# Patient Record
Sex: FEMALE | Race: BLACK OR AFRICAN AMERICAN | NOT HISPANIC OR LATINO | Employment: FULL TIME | URBAN - METROPOLITAN AREA
[De-identification: names, ages, dates, MRNs, and addresses within clinical notes are randomized per-mention and may not be internally consistent; named-entity substitution may affect disease eponyms.]

---

## 2020-11-13 ENCOUNTER — HOSPITAL ENCOUNTER (EMERGENCY)
Facility: HOSPITAL | Age: 25
Discharge: HOME | End: 2020-11-13
Attending: EMERGENCY MEDICINE
Payer: MEDICAID

## 2020-11-13 VITALS
HEIGHT: 59 IN | HEART RATE: 69 BPM | TEMPERATURE: 98 F | WEIGHT: 151 LBS | BODY MASS INDEX: 30.44 KG/M2 | SYSTOLIC BLOOD PRESSURE: 126 MMHG | OXYGEN SATURATION: 100 % | DIASTOLIC BLOOD PRESSURE: 75 MMHG | RESPIRATION RATE: 18 BRPM
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
## 2020-11-13 DIAGNOSIS — M25.532 WRIST PAIN, ACUTE, LEFT: Primary | ICD-10-CM

## 2020-11-13 LAB
B-HCG UR QL: NEGATIVE
CTP QC/QA: YES

## 2020-11-13 PROCEDURE — 99283 EMERGENCY DEPT VISIT LOW MDM: CPT | Mod: 25

## 2020-11-13 PROCEDURE — 81025 URINE PREGNANCY TEST: CPT | Performed by: EMERGENCY MEDICINE

## 2020-11-13 RX ORDER — NAPROXEN 500 MG/1
500 TABLET ORAL 2 TIMES DAILY WITH MEALS
Qty: 14 TABLET | Refills: 0 | Status: SHIPPED | OUTPATIENT
Start: 2020-11-13 | End: 2021-06-22 | Stop reason: SDUPTHER

## 2020-11-14 NOTE — ED PROVIDER NOTES
Encounter Date: 11/13/2020       History     Chief Complaint   Patient presents with    Wrist Pain     Patient presents to ED secondary to left wrist pain s/p fall. Took BC powder at 1530 today with moderate relief. +swelling, +palpable pulse. Possible deformity.      Vidya Grewal, a 25 y.o. female  has no past medical history on file.     She presents to the ED evaluation of left wrist pain after a FOOSH early this morning while running from a dog.  Patient states that she had pain initially, went to sleep and took BC Goody Powder.  States that after her she had increased pain.  Denies any previous history of fracture or surgery to site.  Pain is worse with movement or touch.  Patient is right-hand dominant.        The history is provided by the patient.     Review of patient's allergies indicates:  No Known Allergies  History reviewed. No pertinent past medical history.  History reviewed. No pertinent surgical history.  History reviewed. No pertinent family history.  Social History     Tobacco Use    Smoking status: Former Smoker    Smokeless tobacco: Never Used   Substance Use Topics    Alcohol use: No    Drug use: No     Review of Systems   Constitutional: Negative for fever.   Gastrointestinal: Negative for nausea and vomiting.   Musculoskeletal: Positive for arthralgias.   Skin: Negative for color change and wound.   Allergic/Immunologic: Negative for immunocompromised state.   Neurological: Negative for weakness and numbness.   Psychiatric/Behavioral: Negative for agitation.   All other systems reviewed and are negative.      Physical Exam     Initial Vitals [11/13/20 1702]   BP Pulse Resp Temp SpO2   126/75 69 18 98.2 °F (36.8 °C) 100 %      MAP       --         Physical Exam    Nursing note and vitals reviewed.  Constitutional: She appears well-developed and well-nourished. She is not diaphoretic. No distress.   HENT:   Head: Normocephalic and atraumatic.   Right Ear: External ear normal.   Left Ear:  External ear normal.   Nose: Nose normal.   Eyes: Conjunctivae and EOM are normal.   Neck: Normal range of motion.   Cardiovascular: Normal rate and regular rhythm.   Pulmonary/Chest: Breath sounds normal. No respiratory distress. She has no wheezes. She has no rhonchi. She has no rales.   Musculoskeletal: Normal range of motion.      Left elbow: Normal.      Left wrist: She exhibits tenderness and bony tenderness. She exhibits normal range of motion, no swelling, no effusion, no crepitus, no deformity and no laceration.      Comments: Left wrist:    Neurological: She is alert and oriented to person, place, and time.   Skin: Skin is warm and dry. Capillary refill takes less than 2 seconds. No rash noted. No erythema.   Psychiatric: She has a normal mood and affect. Thought content normal.         ED Course   Procedures  Labs Reviewed   POCT URINE PREGNANCY          Imaging Results          X-Ray Wrist Complete Left (Final result)  Result time 11/13/20 18:19:18    Final result by Khris Brown MD (11/13/20 18:19:18)                 Impression:      1. No convincing acute displaced fracture or dislocation of the wrist.      Electronically signed by: Khris Brown MD  Date:    11/13/2020  Time:    18:19             Narrative:    EXAMINATION:  XR WRIST COMPLETE 3 VIEWS LEFT    CLINICAL HISTORY:  Unspecified fall, initial encounter    TECHNIQUE:  PA, lateral, and oblique views of the left wrist were performed.    COMPARISON:  None    FINDINGS:  Three views left wrist.    No acute displaced fracture or dislocation of the wrist.  No radiopaque foreign body.  There is suspected irregular fusion of the physis versus remote injury of the distal radius along the thenar aspect.  No radiopaque foreign body.                                 Medical Decision Making:   Initial Assessment:   Left wrist pain   Differential Diagnosis:   Fracture, dislocation, sprain/strain   Clinical Tests:   Radiological Study: Ordered and  Reviewed  ED Management:  Pt presents to the ED for evaluation of left wrist pain after FOOSH.  ROM limited d/t pain.  No deformity noted.  NVI.  NTTP scaphoid.  No concerning abnormalities on x-ray.  Patient was given information on symptomatic control and reasons for return. Instructed on need for repeat x-ray if no improvement.  She verbalized understanding and agreement with plan.                               Clinical Impression:       ICD-10-CM ICD-9-CM   1. Wrist pain, acute, left  M25.532 719.43   2. Fall  W19.XXXA E888.9                          ED Disposition Condition    Discharge Stable        ED Prescriptions     Medication Sig Dispense Start Date End Date Auth. Provider    naproxen (NAPROSYN) 500 MG tablet Take 1 tablet (500 mg total) by mouth 2 (two) times daily with meals. 14 tablet 11/13/2020  Yaima Dixon PA-C        Follow-up Information     Follow up With Specialties Details Why Contact Info    [facility], Psychology, Family Medicine, Obstetrics   [address] [facility]  [address]  [facility] [address]  [phone]                                         Yaima Dixon PA-C  11/13/20 1924

## 2021-06-22 ENCOUNTER — HOSPITAL ENCOUNTER (EMERGENCY)
Facility: HOSPITAL | Age: 26
Discharge: HOME | End: 2021-06-22
Attending: EMERGENCY MEDICINE
Payer: MEDICAID

## 2021-06-22 VITALS
HEART RATE: 76 BPM | DIASTOLIC BLOOD PRESSURE: 67 MMHG | HEIGHT: 58 IN | SYSTOLIC BLOOD PRESSURE: 114 MMHG | OXYGEN SATURATION: 98 % | WEIGHT: 135 LBS | BODY MASS INDEX: 28.34 KG/M2 | TEMPERATURE: 99 F | RESPIRATION RATE: 18 BRPM

## 2021-06-22 DIAGNOSIS — R07.89 CHEST DISCOMFORT: ICD-10-CM

## 2021-06-22 DIAGNOSIS — R07.9 CHEST PAIN: Primary | ICD-10-CM

## 2021-06-22 LAB
B-HCG UR QL: NEGATIVE
CTP QC/QA: YES

## 2021-06-22 PROCEDURE — 81025 URINE PREGNANCY TEST: CPT | Performed by: PHYSICIAN ASSISTANT

## 2021-06-22 PROCEDURE — 25000003 PHARM REV CODE 250: Performed by: PHYSICIAN ASSISTANT

## 2021-06-22 PROCEDURE — 93005 ELECTROCARDIOGRAM TRACING: CPT

## 2021-06-22 PROCEDURE — 93010 EKG 12-LEAD: ICD-10-PCS | Mod: ,,, | Performed by: INTERNAL MEDICINE

## 2021-06-22 PROCEDURE — 93010 ELECTROCARDIOGRAM REPORT: CPT | Mod: ,,, | Performed by: INTERNAL MEDICINE

## 2021-06-22 PROCEDURE — 99284 EMERGENCY DEPT VISIT MOD MDM: CPT | Mod: 25

## 2021-06-22 RX ORDER — NAPROXEN 500 MG/1
500 TABLET ORAL
Status: COMPLETED | OUTPATIENT
Start: 2021-06-22 | End: 2021-06-22

## 2021-06-22 RX ORDER — NAPROXEN 500 MG/1
500 TABLET ORAL 2 TIMES DAILY WITH MEALS
Qty: 14 TABLET | Refills: 0 | Status: SHIPPED | OUTPATIENT
Start: 2021-06-22 | End: 2022-11-28

## 2021-06-22 RX ADMIN — NAPROXEN 500 MG: 500 TABLET ORAL at 04:06

## 2022-11-28 ENCOUNTER — INITIAL PRENATAL (OUTPATIENT)
Dept: OBSTETRICS AND GYNECOLOGY | Facility: CLINIC | Age: 27
End: 2022-11-28
Payer: MEDICAID

## 2022-11-28 ENCOUNTER — APPOINTMENT (OUTPATIENT)
Dept: LAB | Facility: HOSPITAL | Age: 27
End: 2022-11-28
Attending: NURSE PRACTITIONER
Payer: MEDICAID

## 2022-11-28 VITALS
WEIGHT: 142.44 LBS | BODY MASS INDEX: 29.77 KG/M2 | SYSTOLIC BLOOD PRESSURE: 130 MMHG | DIASTOLIC BLOOD PRESSURE: 85 MMHG

## 2022-11-28 DIAGNOSIS — Z34.91 PRENATAL CARE IN FIRST TRIMESTER: Primary | ICD-10-CM

## 2022-11-28 LAB
B-HCG UR QL: POSITIVE
CTP QC/QA: YES

## 2022-11-28 PROCEDURE — 99203 PR OFFICE/OUTPT VISIT, NEW, LEVL III, 30-44 MIN: ICD-10-PCS | Mod: S$PBB,TH,, | Performed by: NURSE PRACTITIONER

## 2022-11-28 PROCEDURE — 87591 N.GONORRHOEAE DNA AMP PROB: CPT | Performed by: NURSE PRACTITIONER

## 2022-11-28 PROCEDURE — 87086 URINE CULTURE/COLONY COUNT: CPT | Performed by: NURSE PRACTITIONER

## 2022-11-28 PROCEDURE — 99213 OFFICE O/P EST LOW 20 MIN: CPT | Mod: PBBFAC,PO | Performed by: NURSE PRACTITIONER

## 2022-11-28 PROCEDURE — 88175 CYTOPATH C/V AUTO FLUID REDO: CPT | Performed by: NURSE PRACTITIONER

## 2022-11-28 PROCEDURE — 87491 CHLMYD TRACH DNA AMP PROBE: CPT | Performed by: NURSE PRACTITIONER

## 2022-11-28 PROCEDURE — 81025 URINE PREGNANCY TEST: CPT | Mod: PBBFAC,PO | Performed by: NURSE PRACTITIONER

## 2022-11-28 PROCEDURE — 99999 PR PBB SHADOW E&M-EST. PATIENT-LVL III: CPT | Mod: PBBFAC,,, | Performed by: NURSE PRACTITIONER

## 2022-11-28 PROCEDURE — 99999 PR PBB SHADOW E&M-EST. PATIENT-LVL III: ICD-10-PCS | Mod: PBBFAC,,, | Performed by: NURSE PRACTITIONER

## 2022-11-28 PROCEDURE — 99203 OFFICE O/P NEW LOW 30 MIN: CPT | Mod: S$PBB,TH,, | Performed by: NURSE PRACTITIONER

## 2022-11-28 NOTE — PATIENT INSTRUCTIONS
The Mtriyiqsx58 (MT21 for short) is a noninvasive prenatal blood test that is done after 11 weeks. It can detect chromosomal abnormalities (trisomy 21-Down syndrome, trisomy 18-Starks syndrome, and trisomy 13-Patau syndrome). It can also detect the sex of your baby if you choose to know this. All insurances do not cover this test. We recommend all women who are interested to call this phone number for more information- [phone]    If for some reason your insurance does not cover the MT21 and/or the out of pocket expense is too much, there other options for testing that are usually covered. The names for the alternative screenings are the sequential screen (done between 12-14 weeks) and the quad screen (done between 16-18 weeks). You can call your insurance to verify coverage for these.

## 2022-11-28 NOTE — PROGRESS NOTES
CC: Initial OB    Vidya Grewal is a 27 y.o. female  presents for initial OB.Pregnancy confirmed at [facility] ED via u/s was told she was 6 weeks. She does not have medical records     She  reports intermittent nausea and vomiting. Denies abdominal pain or vaginal bleeding.    PAP: =     Recently quit smoking black and mild 2022  Wears seatbelts    Denies any domestic violence    History reviewed. No pertinent past medical history.  Past Surgical History:   Procedure Laterality Date    MULTIPLE TOOTH EXTRACTIONS      WISDOM TOOTH EXTRACTION       Social History     Socioeconomic History    Marital status: Single   Tobacco Use    Smoking status: Former     Types: Cigarettes    Smokeless tobacco: Never   Substance and Sexual Activity    Alcohol use: No    Drug use: Not Currently     Types: Marijuana    Sexual activity: Yes     Partners: Male     Family History   Problem Relation Age of Onset    Diabetes Maternal Grandmother     Diabetes Mother     Breast cancer Neg Hx     Colon cancer Neg Hx     Ovarian cancer Neg Hx      OB History    Para Term  AB Living   1             SAB IAB Ectopic Multiple Live Births                  # Outcome Date GA Lbr Jesus/2nd Weight Sex Delivery Anes PTL Lv   1 Current                /85   Wt 64.6 kg (142 lb 6.7 oz)   LMP 2022 (Approximate)   BMI 29.77 kg/m²     ROS:  GENERAL: Denies weight gain or weight loss. Feeling well overall.   SKIN: Denies rash or lesions.   HEAD: Denies head injury or headache.   NODES: Denies enlarged lymph nodes.   CHEST: Denies chest pain or shortness of breath.   CARDIOVASCULAR: Denies palpitations or left sided chest pain.   ABDOMEN: + nausea and vomiting No abdominal pain, constipation, diarrhea or rectal bleeding.   URINARY: No frequency, dysuria, hematuria, or burning on urination.  REPRODUCTIVE: See HPI.   BREASTS: Denies pain, lumps, or nipple discharge.   HEMATOLOGIC: No easy bruisability or  excessive bleeding.   MUSCULOSKELETAL: Denies joint pain or swelling.   NEUROLOGIC: Denies syncope or weakness.   PSYCHIATRIC: Denies depression, anxiety or mood swings.    PE:   APPEARANCE: Well nourished, well developed, in no acute distress.  AFFECT: WNL, alert and oriented x 3.  SKIN: No acne or hirsutism.  NECK: Neck symmetric  NODES: No inguinal, cervical, axillary or femoral lymph node enlargement.  CHEST: Good respiratory effort.   ABDOMEN: Soft.  No tenderness or masses. Nondistended.  PELVIC: Normal external female genitalia without lesions. Normal hair distribution. Adequate perineal body, normal urethral meatus. Vagina moist and well rugated without lesions or discharge. Cervix pink, without lesions, discharge or tenderness. No significant cystocele or rectocele.  Bimanual exam shows uterus to be normal size, regular, mobile and nontender. Adnexa without masses or tenderness.  EXTREMITIES: No edema.      ASSESSMENT and PLAN:    ICD-10-CM ICD-9-CM    1. Prenatal care in first trimester  Z34.91 V22.1 Hepatitis C Antibody      HIV 1/2 Ag/Ab (4th Gen)      RPR      Hepatitis B surface antigen      Type & Screen      Rubella antibody, IgG      Urine culture      CBC auto differential      Basic metabolic panel      US OB/GYN Procedure ([facility])      C. trachomatis/N. gonorrhoeae by AMP DNA      POCT urine pregnancy      Hemoglobin Electrophoresis,Hgb A2 [place].      Cystic Fibrosis Mutation Panel      Liquid-Based Pap Smear, Screening          Discussed:  -Nausea/vomiting: small frequent meals throughout the day versus three large meals, stay hydrated: drink plenty of water-try to avoid immediately eating or drinking after vomiting, okay to take OTC unisom and/or vitamin B6    -Contingency screen discussed-information provided  -Foods to avoid in pregnancy (i.e. sushi, raw food, unpasteurized cheeses), limit fish that are high in mercury, warm deli meat.   -Prenatal vitamin options (i.e. stool softener,  DHA)  -Pregnancy A-Z booklet given  -Potential of male provider at delivery due to OB call rotation  -COVID 19 vaccination: no, COVID 19 and increase risk of pregnancy complications if infection occurs, encouraged her to weigh exposure risk and to consider any personal underlying health conditions. COVID 19 vaccine is not contraindicated in pregnancy or with breast feeding, ACOG supports the vaccine and that the benefit of the vaccine most likely outweighs any risk- she states she will think about it and call with her decision.    -Flu vaccination: declines  -Pain, bleeding, and ED precautions given.    All questions answered, patient verbalizes understanding of the above.    FOLLOW-UP in 4 weeks

## 2022-11-29 ENCOUNTER — PATIENT MESSAGE (OUTPATIENT)
Dept: OBSTETRICS AND GYNECOLOGY | Facility: CLINIC | Age: 27
End: 2022-11-29
Payer: MEDICAID

## 2022-11-30 LAB
BACTERIA UR CULT: NORMAL
C TRACH DNA SPEC QL NAA+PROBE: NOT DETECTED
N GONORRHOEA DNA SPEC QL NAA+PROBE: NOT DETECTED

## 2022-12-12 ENCOUNTER — PROCEDURE VISIT (OUTPATIENT)
Dept: OBSTETRICS AND GYNECOLOGY | Facility: CLINIC | Age: 27
End: 2022-12-12
Payer: MEDICAID

## 2022-12-12 DIAGNOSIS — Z34.91 PRENATAL CARE IN FIRST TRIMESTER: ICD-10-CM

## 2022-12-12 PROCEDURE — 76801 OB US < 14 WKS SINGLE FETUS: CPT | Mod: PBBFAC,PO | Performed by: OBSTETRICS & GYNECOLOGY

## 2022-12-12 PROCEDURE — 76801 PR US, OB <14WKS, TRANSABD, SINGLE GESTATION: ICD-10-PCS | Mod: 26,S$PBB,, | Performed by: OBSTETRICS & GYNECOLOGY

## 2022-12-12 PROCEDURE — 76801 OB US < 14 WKS SINGLE FETUS: CPT | Mod: 26,S$PBB,, | Performed by: OBSTETRICS & GYNECOLOGY

## 2022-12-23 ENCOUNTER — PATIENT MESSAGE (OUTPATIENT)
Dept: OBSTETRICS AND GYNECOLOGY | Facility: CLINIC | Age: 27
End: 2022-12-23
Payer: MEDICAID

## 2023-01-23 ENCOUNTER — ROUTINE PRENATAL (OUTPATIENT)
Dept: OBSTETRICS AND GYNECOLOGY | Facility: CLINIC | Age: 28
End: 2023-01-23
Payer: MEDICAID

## 2023-01-23 ENCOUNTER — PATIENT MESSAGE (OUTPATIENT)
Dept: ADMINISTRATIVE | Facility: OTHER | Age: 28
End: 2023-01-23
Payer: MEDICAID

## 2023-01-23 VITALS
BODY MASS INDEX: 33.36 KG/M2 | SYSTOLIC BLOOD PRESSURE: 129 MMHG | WEIGHT: 159.63 LBS | DIASTOLIC BLOOD PRESSURE: 87 MMHG

## 2023-01-23 DIAGNOSIS — Z3A.15 15 WEEKS GESTATION OF PREGNANCY: Primary | ICD-10-CM

## 2023-01-23 PROCEDURE — 99212 OFFICE O/P EST SF 10 MIN: CPT | Mod: TH,S$PBB,, | Performed by: STUDENT IN AN ORGANIZED HEALTH CARE EDUCATION/TRAINING PROGRAM

## 2023-01-23 PROCEDURE — 99212 OFFICE O/P EST SF 10 MIN: CPT | Mod: PBBFAC,PO | Performed by: STUDENT IN AN ORGANIZED HEALTH CARE EDUCATION/TRAINING PROGRAM

## 2023-01-23 PROCEDURE — 99999 PR PBB SHADOW E&M-EST. PATIENT-LVL II: CPT | Mod: PBBFAC,,, | Performed by: STUDENT IN AN ORGANIZED HEALTH CARE EDUCATION/TRAINING PROGRAM

## 2023-01-23 PROCEDURE — 99999 PR PBB SHADOW E&M-EST. PATIENT-LVL II: ICD-10-PCS | Mod: PBBFAC,,, | Performed by: STUDENT IN AN ORGANIZED HEALTH CARE EDUCATION/TRAINING PROGRAM

## 2023-01-23 PROCEDURE — 99212 PR OFFICE/OUTPT VISIT, EST, LEVL II, 10-19 MIN: ICD-10-PCS | Mod: TH,S$PBB,, | Performed by: STUDENT IN AN ORGANIZED HEALTH CARE EDUCATION/TRAINING PROGRAM

## 2023-01-23 NOTE — PROGRESS NOTES
Reason for Visit   Routine Prenatal Visit    HPI   27 y.o., at 15w4d by Estimated Date of Delivery: 7/13/23    Patient feels well today, no complaints    Contractions: No   Bleeding: No   Loss of fluid: No   Fetal movement: Flutters   Nausea: Occasional    Vomiting: No   Headache: no     Pregnancy dating, labs, ultrasound reports, prenatal testing, and problem list; prior records and results; and available outside records were reviewed and updated in EMR.      No current outpatient medications on file.   Exam   /87   Wt 72.4 kg (159 lb 9.8 oz)   LMP 09/28/2022 (Approximate)   BMI 33.36 kg/m²     GENERAL: No acute distress  ABD: Gravid  Fundal height: 16  FHR: 150  SVE: n/a    Assessment and Plan   15 weeks gestation of pregnancy  -     US OB/GYN Procedure ([facility]); Future; Expected date: 02/27/2023  -     Connected MOM Enrollment  -     Assign Connected MOM Program Consent Questionnaire        - PRANAY 7/13/2023 by 9 week US   - PNLs: WNL  - carrier screen: neg   - aneuploidy screen:  desires M21, ordered provided and patient to determine if insurance covers   - Flu: did not get this year, does not desire (counseled today)  - Covid: did not get, does not desire (counseled today)   - msAFP: ordered  - [facility] US: anatomy scan at 20weeks ordered to be scheduled   - 1hr GTT: 24-28wga  - CBC: 24-28wga  - Rhogam: O pos  - Tdap: 28wga  - consents: to be signed at future visit  - connected moms: discussed today, patient would like to join program (ordered)            Pain and bleeding precautions given  Follow-up: 4 weeks

## 2023-01-26 ENCOUNTER — PATIENT MESSAGE (OUTPATIENT)
Dept: OTHER | Facility: OTHER | Age: 28
End: 2023-01-26
Payer: MEDICAID

## 2023-01-31 ENCOUNTER — PATIENT MESSAGE (OUTPATIENT)
Dept: OBSTETRICS AND GYNECOLOGY | Facility: CLINIC | Age: 28
End: 2023-01-31
Payer: MEDICAID

## 2023-02-02 ENCOUNTER — PATIENT MESSAGE (OUTPATIENT)
Dept: OTHER | Facility: OTHER | Age: 28
End: 2023-02-02
Payer: MEDICAID

## 2023-02-20 ENCOUNTER — ROUTINE PRENATAL (OUTPATIENT)
Dept: OBSTETRICS AND GYNECOLOGY | Facility: CLINIC | Age: 28
End: 2023-02-20
Payer: MEDICAID

## 2023-02-20 VITALS
WEIGHT: 162.06 LBS | BODY MASS INDEX: 33.87 KG/M2 | SYSTOLIC BLOOD PRESSURE: 120 MMHG | DIASTOLIC BLOOD PRESSURE: 80 MMHG

## 2023-02-20 DIAGNOSIS — Z3A.19 19 WEEKS GESTATION OF PREGNANCY: Primary | ICD-10-CM

## 2023-02-20 PROCEDURE — 99212 OFFICE O/P EST SF 10 MIN: CPT | Mod: PBBFAC,PO | Performed by: STUDENT IN AN ORGANIZED HEALTH CARE EDUCATION/TRAINING PROGRAM

## 2023-02-20 PROCEDURE — 99999 PR PBB SHADOW E&M-EST. PATIENT-LVL II: ICD-10-PCS | Mod: PBBFAC,,, | Performed by: STUDENT IN AN ORGANIZED HEALTH CARE EDUCATION/TRAINING PROGRAM

## 2023-02-20 PROCEDURE — 99212 PR OFFICE/OUTPT VISIT, EST, LEVL II, 10-19 MIN: ICD-10-PCS | Mod: TH,S$PBB,, | Performed by: STUDENT IN AN ORGANIZED HEALTH CARE EDUCATION/TRAINING PROGRAM

## 2023-02-20 PROCEDURE — 99999 PR PBB SHADOW E&M-EST. PATIENT-LVL II: CPT | Mod: PBBFAC,,, | Performed by: STUDENT IN AN ORGANIZED HEALTH CARE EDUCATION/TRAINING PROGRAM

## 2023-02-20 PROCEDURE — 99212 OFFICE O/P EST SF 10 MIN: CPT | Mod: TH,S$PBB,, | Performed by: STUDENT IN AN ORGANIZED HEALTH CARE EDUCATION/TRAINING PROGRAM

## 2023-02-20 RX ORDER — CEPHALEXIN 500 MG/1
CAPSULE ORAL EVERY 8 HOURS
COMMUNITY
Start: 2023-01-10 | End: 2023-05-18

## 2023-02-20 RX ORDER — PRENATAL VIT 14/IRON FUM/FOLIC 29 MG-1 MG
1 TABLET,CHEWABLE ORAL
Status: ON HOLD | COMMUNITY
Start: 2022-11-29 | End: 2023-06-26

## 2023-02-20 NOTE — PROGRESS NOTES
Reason for Visit   Routine Prenatal Visit    HPI   27 y.o., at 19w4d by Estimated Date of Delivery: 7/13/23    Patient feels well today, no complaints    Contractions: No   Bleeding: No   Loss of fluid: No   Fetal movement: Yes    Nausea: No     Vomiting: No   Headache: no     Pregnancy dating, labs, ultrasound reports, prenatal testing, and problem list; prior records and results; and available outside records were reviewed and updated in EMR.        Current Outpatient Medications:     cephALEXin (KEFLEX) 500 MG capsule, Take by mouth every 8 (eight) hours., Disp: , Rfl:     COMPLETENATE 29 mg iron- 1 mg Chew, Take 1 tablet by mouth., Disp: , Rfl:    Exam   /80   Wt 73.5 kg (162 lb 0.6 oz)   LMP 09/28/2022 (Approximate)   BMI 33.87 kg/m²     GENERAL: No acute distress  ABD: Gravid  Fundal height: 18  FHR: 160  SVE: n/a    Assessment and Plan   19 weeks gestation of pregnancy        - PRANAY 7/13/2023 by 9 week US   - PNLs: WNL  - carrier screen: neg   - aneuploidy screen: patient decided not to proceed after discussion with apolinar   - Flu: did not get this year, does not desire (counseled previously)  - Covid: did not get, does not desire (counseled previously)   - msAFP: ordered, not yet completed   - [facility] US: anatomy scan to be scheduled today   - 1hr GTT: 24-28wga  - CBC: 24-28wga  - Rhogam: O pos  - Tdap: 28wga  - consents: to be signed at future visit  - connected moms: pending            Pain and bleeding precautions given  Follow-up: 4 weeks

## 2023-02-23 ENCOUNTER — PATIENT MESSAGE (OUTPATIENT)
Dept: OTHER | Facility: OTHER | Age: 28
End: 2023-02-23
Payer: MEDICAID

## 2023-03-09 ENCOUNTER — PROCEDURE VISIT (OUTPATIENT)
Dept: MATERNAL FETAL MEDICINE | Facility: CLINIC | Age: 28
End: 2023-03-09
Payer: MEDICAID

## 2023-03-09 DIAGNOSIS — Z36.4 ULTRASOUND FOR ANTENATAL SCREENING FOR FETAL GROWTH RESTRICTION: ICD-10-CM

## 2023-03-09 DIAGNOSIS — Z3A.22 22 WEEKS GESTATION OF PREGNANCY: Primary | ICD-10-CM

## 2023-03-09 DIAGNOSIS — Z3A.15 15 WEEKS GESTATION OF PREGNANCY: ICD-10-CM

## 2023-03-09 DIAGNOSIS — Z36.3 ANTENATAL SCREENING FOR MALFORMATION USING ULTRASONICS: ICD-10-CM

## 2023-03-09 PROCEDURE — 76805 OB US >/= 14 WKS SNGL FETUS: CPT | Mod: 26,S$PBB,, | Performed by: OBSTETRICS & GYNECOLOGY

## 2023-03-09 PROCEDURE — 76805 PR US, OB 14+WKS, TRANSABD, SINGLE GESTATION: ICD-10-PCS | Mod: 26,S$PBB,, | Performed by: OBSTETRICS & GYNECOLOGY

## 2023-03-09 PROCEDURE — 76805 OB US >/= 14 WKS SNGL FETUS: CPT | Mod: PBBFAC,PO | Performed by: OBSTETRICS & GYNECOLOGY

## 2023-03-20 ENCOUNTER — ROUTINE PRENATAL (OUTPATIENT)
Dept: OBSTETRICS AND GYNECOLOGY | Facility: CLINIC | Age: 28
End: 2023-03-20
Payer: MEDICAID

## 2023-03-20 VITALS
WEIGHT: 169.06 LBS | SYSTOLIC BLOOD PRESSURE: 118 MMHG | DIASTOLIC BLOOD PRESSURE: 58 MMHG | BODY MASS INDEX: 35.34 KG/M2

## 2023-03-20 DIAGNOSIS — Z3A.23 23 WEEKS GESTATION OF PREGNANCY: Primary | ICD-10-CM

## 2023-03-20 PROCEDURE — 99212 PR OFFICE/OUTPT VISIT, EST, LEVL II, 10-19 MIN: ICD-10-PCS | Mod: TH,S$PBB,, | Performed by: STUDENT IN AN ORGANIZED HEALTH CARE EDUCATION/TRAINING PROGRAM

## 2023-03-20 PROCEDURE — 99212 OFFICE O/P EST SF 10 MIN: CPT | Mod: PBBFAC,PO | Performed by: STUDENT IN AN ORGANIZED HEALTH CARE EDUCATION/TRAINING PROGRAM

## 2023-03-20 PROCEDURE — 99999 PR PBB SHADOW E&M-EST. PATIENT-LVL II: CPT | Mod: PBBFAC,,, | Performed by: STUDENT IN AN ORGANIZED HEALTH CARE EDUCATION/TRAINING PROGRAM

## 2023-03-20 PROCEDURE — 99212 OFFICE O/P EST SF 10 MIN: CPT | Mod: TH,S$PBB,, | Performed by: STUDENT IN AN ORGANIZED HEALTH CARE EDUCATION/TRAINING PROGRAM

## 2023-03-20 PROCEDURE — 99999 PR PBB SHADOW E&M-EST. PATIENT-LVL II: ICD-10-PCS | Mod: PBBFAC,,, | Performed by: STUDENT IN AN ORGANIZED HEALTH CARE EDUCATION/TRAINING PROGRAM

## 2023-03-20 NOTE — PROGRESS NOTES
Reason for Visit   Routine Prenatal Visit    HPI   27 y.o., at 23w4d by Estimated Date of Delivery: 23    Patient feels well today, no complaints. Has mild rash/outbreak on chest, did try a new soap recently.     Contractions: No   Bleeding: No   Loss of fluid: No   Fetal movement: Yes    Nausea: No     Vomiting: No   Headache: no     Pregnancy dating, labs, ultrasound reports, prenatal testing, and problem list; prior records and results; and available outside records were reviewed and updated in EMR.        Current Outpatient Medications:     cephALEXin (KEFLEX) 500 MG capsule, Take by mouth every 8 (eight) hours., Disp: , Rfl:     COMPLETENATE 29 mg iron- 1 mg Chew, Take 1 tablet by mouth., Disp: , Rfl:    Exam   BP (!) 118/58   Wt 76.7 kg (169 lb 1.5 oz)   LMP 2022 (Approximate)   BMI 35.34 kg/m²     GENERAL: No acute distress  ABD: Gravid  Fundal height: 18  FHR: 160  SVE: n/a    Assessment and Plan   23 weeks gestation of pregnancy  -     US OB/GYN Procedure ([facility]); Future; Expected date: 2023        - PRANAY 2023 by 9 week US   - PNLs: WNL  - carrier screen: neg   - aneuploidy screen: patient decided not to proceed after discussion with apolinar   - Flu: did not get this year, does not desire (counseled previously)  - Covid: did not get, does not desire (counseled previously)   - msAFP: ordered, did not complete  - [facility] US: anatomy 3/11 wnl, repeat in 4-6weeks ordered  - 1hr GTT: 24-28wga (will complete next visit)   - CBC: 24-28wga (will complete next visit)   - Rhogam: O pos  - Tdap: 28wga  - consents: signed 3/2023  - connected moms: pending             labor precautions given  Follow-up: 4 weeks

## 2023-03-23 ENCOUNTER — PATIENT MESSAGE (OUTPATIENT)
Dept: OTHER | Facility: OTHER | Age: 28
End: 2023-03-23
Payer: MEDICAID

## 2023-04-06 ENCOUNTER — PATIENT MESSAGE (OUTPATIENT)
Dept: OTHER | Facility: OTHER | Age: 28
End: 2023-04-06
Payer: MEDICAID

## 2023-04-17 ENCOUNTER — PROCEDURE VISIT (OUTPATIENT)
Dept: MATERNAL FETAL MEDICINE | Facility: CLINIC | Age: 28
End: 2023-04-17
Payer: MEDICAID

## 2023-04-17 ENCOUNTER — LAB VISIT (OUTPATIENT)
Dept: LAB | Facility: HOSPITAL | Age: 28
End: 2023-04-17
Payer: MEDICAID

## 2023-04-17 ENCOUNTER — ROUTINE PRENATAL (OUTPATIENT)
Dept: OBSTETRICS AND GYNECOLOGY | Facility: CLINIC | Age: 28
End: 2023-04-17
Payer: MEDICAID

## 2023-04-17 VITALS
WEIGHT: 168.88 LBS | BODY MASS INDEX: 35.29 KG/M2 | DIASTOLIC BLOOD PRESSURE: 74 MMHG | SYSTOLIC BLOOD PRESSURE: 116 MMHG

## 2023-04-17 DIAGNOSIS — Z3A.23 23 WEEKS GESTATION OF PREGNANCY: ICD-10-CM

## 2023-04-17 DIAGNOSIS — Z3A.27 27 WEEKS GESTATION OF PREGNANCY: Primary | ICD-10-CM

## 2023-04-17 LAB
ERYTHROCYTE [DISTWIDTH] IN BLOOD BY AUTOMATED COUNT: 13.3 % (ref 11.5–14.5)
GLUCOSE SERPL-MCNC: 354 MG/DL (ref 70–140)
HCT VFR BLD AUTO: 37.8 % (ref 37–48.5)
HGB BLD-MCNC: 11.5 G/DL (ref 12–16)
MCH RBC QN AUTO: 22.9 PG (ref 27–31)
MCHC RBC AUTO-ENTMCNC: 30.4 G/DL (ref 32–36)
MCV RBC AUTO: 75 FL (ref 82–98)
PLATELET # BLD AUTO: 351 K/UL (ref 150–450)
PMV BLD AUTO: 11.4 FL (ref 9.2–12.9)
RBC # BLD AUTO: 5.02 M/UL (ref 4–5.4)
WBC # BLD AUTO: 7.31 K/UL (ref 3.9–12.7)

## 2023-04-17 PROCEDURE — 99999 PR PBB SHADOW E&M-EST. PATIENT-LVL II: ICD-10-PCS | Mod: PBBFAC,,, | Performed by: STUDENT IN AN ORGANIZED HEALTH CARE EDUCATION/TRAINING PROGRAM

## 2023-04-17 PROCEDURE — 99212 OFFICE O/P EST SF 10 MIN: CPT | Mod: PBBFAC,PO | Performed by: STUDENT IN AN ORGANIZED HEALTH CARE EDUCATION/TRAINING PROGRAM

## 2023-04-17 PROCEDURE — 76816 OB US FOLLOW-UP PER FETUS: CPT | Mod: PBBFAC,PO | Performed by: OBSTETRICS & GYNECOLOGY

## 2023-04-17 PROCEDURE — 76816 US OB/GYN PROCEDURE (VIEWPOINT): ICD-10-PCS | Mod: 26,S$PBB,, | Performed by: OBSTETRICS & GYNECOLOGY

## 2023-04-17 PROCEDURE — 82950 GLUCOSE TEST: CPT | Performed by: STUDENT IN AN ORGANIZED HEALTH CARE EDUCATION/TRAINING PROGRAM

## 2023-04-17 PROCEDURE — 99212 PR OFFICE/OUTPT VISIT, EST, LEVL II, 10-19 MIN: ICD-10-PCS | Mod: TH,S$PBB,, | Performed by: STUDENT IN AN ORGANIZED HEALTH CARE EDUCATION/TRAINING PROGRAM

## 2023-04-17 PROCEDURE — 36415 COLL VENOUS BLD VENIPUNCTURE: CPT | Performed by: STUDENT IN AN ORGANIZED HEALTH CARE EDUCATION/TRAINING PROGRAM

## 2023-04-17 PROCEDURE — 99212 OFFICE O/P EST SF 10 MIN: CPT | Mod: TH,S$PBB,, | Performed by: STUDENT IN AN ORGANIZED HEALTH CARE EDUCATION/TRAINING PROGRAM

## 2023-04-17 PROCEDURE — 99999 PR PBB SHADOW E&M-EST. PATIENT-LVL II: CPT | Mod: PBBFAC,,, | Performed by: STUDENT IN AN ORGANIZED HEALTH CARE EDUCATION/TRAINING PROGRAM

## 2023-04-17 PROCEDURE — 85027 COMPLETE CBC AUTOMATED: CPT | Performed by: STUDENT IN AN ORGANIZED HEALTH CARE EDUCATION/TRAINING PROGRAM

## 2023-04-17 NOTE — PROGRESS NOTES
Reason for Visit   Routine Prenatal Visit    HPI   27 y.o., at 27w4d by Estimated Date of Delivery: 23    Patient feels well today, no complaints.      Contractions: No   Bleeding: No   Loss of fluid: No   Fetal movement: Yes    Nausea: No     Vomiting: No   Headache: no     Pregnancy dating, labs, ultrasound reports, prenatal testing, and problem list; prior records and results; and available outside records were reviewed and updated in EMR.        Current Outpatient Medications:     cephALEXin (KEFLEX) 500 MG capsule, Take by mouth every 8 (eight) hours., Disp: , Rfl:     COMPLETENATE 29 mg iron- 1 mg Chew, Take 1 tablet by mouth., Disp: , Rfl:    Exam   /74   Wt 76.6 kg (168 lb 14 oz)   LMP 2022 (Approximate)   BMI 35.29 kg/m²     GENERAL: No acute distress  ABD: Gravid  Fundal height: 29  FHR: 155  SVE: n/a    Assessment and Plan   27 weeks gestation of pregnancy  -     US MFM Procedure ([facility]); Future; Expected date: 2023  -     Ambulatory referral/consult to Perinatology; Future; Expected date: 2023        - PRANAY 2023 by 9 week US   - PNLs: WNL  - carrier screen: neg   - aneuploidy screen: patient decided not to proceed after discussion with apolinar   - Flu: did not get this year, does not desire (counseled previously)  - Covid: did not get, does not desire (counseled previously)   - msAFP: ordered, did not complete  - MFM US: anatomy 3/11 wnl, repeat incomplete  [ ] MFM scan and consult ordered for incomplete heart views  - 1hr GTT: will complete today   - CBC: will complete today   - Rhogam: O pos  - Tdap: discussed today, will consider and notify MD if she desires to complete  - consents: signed 3/2023  - connected moms: pending             labor precautions given  Follow-up: 4 weeks

## 2023-04-19 ENCOUNTER — TELEPHONE (OUTPATIENT)
Dept: OBSTETRICS AND GYNECOLOGY | Facility: CLINIC | Age: 28
End: 2023-04-19
Payer: MEDICAID

## 2023-04-19 ENCOUNTER — PATIENT MESSAGE (OUTPATIENT)
Dept: OBSTETRICS AND GYNECOLOGY | Facility: CLINIC | Age: 28
End: 2023-04-19
Payer: MEDICAID

## 2023-04-19 DIAGNOSIS — O24.410 DIET CONTROLLED GESTATIONAL DIABETES MELLITUS (GDM) IN SECOND TRIMESTER: Primary | ICD-10-CM

## 2023-04-19 DIAGNOSIS — O24.410 DIET CONTROLLED GESTATIONAL DIABETES MELLITUS (GDM) IN SECOND TRIMESTER: ICD-10-CM

## 2023-04-19 RX ORDER — INSULIN PUMP SYRINGE, 3 ML
EACH MISCELLANEOUS
Qty: 1 EACH | Refills: 0 | Status: SHIPPED | OUTPATIENT
Start: 2023-04-19 | End: 2023-04-19 | Stop reason: ALTCHOICE

## 2023-04-19 RX ORDER — INSULIN PUMP SYRINGE, 3 ML
EACH MISCELLANEOUS
Qty: 1 EACH | Refills: 0 | Status: ON HOLD | OUTPATIENT
Start: 2023-04-19 | End: 2023-06-26

## 2023-04-19 NOTE — TELEPHONE ENCOUNTER
Called patient to review 1hr glucose test and dietary recommendations, start for checking blood sugars and diabetic educator. All questions answered

## 2023-04-19 NOTE — TELEPHONE ENCOUNTER
TRUEMETRIX kit covered, cannot e-prescribe specifically with brand name but left in instructions. Called pharmacy to prescribe, left voicemail message.

## 2023-04-20 ENCOUNTER — PATIENT MESSAGE (OUTPATIENT)
Dept: OTHER | Facility: OTHER | Age: 28
End: 2023-04-20
Payer: MEDICAID

## 2023-04-27 ENCOUNTER — PATIENT MESSAGE (OUTPATIENT)
Dept: MATERNAL FETAL MEDICINE | Facility: CLINIC | Age: 28
End: 2023-04-27
Payer: MEDICAID

## 2023-04-27 ENCOUNTER — TELEPHONE (OUTPATIENT)
Dept: MATERNAL FETAL MEDICINE | Facility: CLINIC | Age: 28
End: 2023-04-27
Payer: MEDICAID

## 2023-04-27 NOTE — TELEPHONE ENCOUNTER
Called patient because she was going to not be able to make it to 215 PM for her 140 PM.  Patient offered to reschedule because no open ultrasound spots to move her to.  Patient verbalized her understanding and rescheduled.

## 2023-05-01 ENCOUNTER — ROUTINE PRENATAL (OUTPATIENT)
Dept: OBSTETRICS AND GYNECOLOGY | Facility: CLINIC | Age: 28
End: 2023-05-01
Payer: MEDICAID

## 2023-05-01 VITALS
WEIGHT: 172.19 LBS | BODY MASS INDEX: 35.99 KG/M2 | DIASTOLIC BLOOD PRESSURE: 77 MMHG | SYSTOLIC BLOOD PRESSURE: 124 MMHG

## 2023-05-01 DIAGNOSIS — Z3A.29 29 WEEKS GESTATION OF PREGNANCY: Primary | ICD-10-CM

## 2023-05-01 PROCEDURE — 99212 PR OFFICE/OUTPT VISIT, EST, LEVL II, 10-19 MIN: ICD-10-PCS | Mod: TH,S$PBB,, | Performed by: STUDENT IN AN ORGANIZED HEALTH CARE EDUCATION/TRAINING PROGRAM

## 2023-05-01 PROCEDURE — 99212 OFFICE O/P EST SF 10 MIN: CPT | Mod: TH,S$PBB,, | Performed by: STUDENT IN AN ORGANIZED HEALTH CARE EDUCATION/TRAINING PROGRAM

## 2023-05-01 PROCEDURE — 99999 PR PBB SHADOW E&M-EST. PATIENT-LVL II: CPT | Mod: PBBFAC,,, | Performed by: STUDENT IN AN ORGANIZED HEALTH CARE EDUCATION/TRAINING PROGRAM

## 2023-05-01 PROCEDURE — 99999 PR PBB SHADOW E&M-EST. PATIENT-LVL II: ICD-10-PCS | Mod: PBBFAC,,, | Performed by: STUDENT IN AN ORGANIZED HEALTH CARE EDUCATION/TRAINING PROGRAM

## 2023-05-01 PROCEDURE — 99212 OFFICE O/P EST SF 10 MIN: CPT | Mod: PBBFAC,PO | Performed by: STUDENT IN AN ORGANIZED HEALTH CARE EDUCATION/TRAINING PROGRAM

## 2023-05-02 ENCOUNTER — CLINICAL SUPPORT (OUTPATIENT)
Dept: DIABETES | Facility: CLINIC | Age: 28
End: 2023-05-02
Payer: MEDICAID

## 2023-05-02 DIAGNOSIS — O24.410 DIET CONTROLLED GESTATIONAL DIABETES MELLITUS (GDM) IN SECOND TRIMESTER: ICD-10-CM

## 2023-05-02 PROCEDURE — G0108 DIAB MANAGE TRN  PER INDIV: HCPCS | Mod: PBBFAC

## 2023-05-02 NOTE — PROGRESS NOTES
Diabetes Care Specialist Progress Note  Author: Rosy Farmer RD  Date: 5/2/2023    Program Intake  Reason for Diabetes Program Visit:: Initial Diabetes Assessment  Current diabetes risk level:: high  In the last 12 months, have you:: none    No results found for: LABA1C, HGBA1C    Clinical    Labs  Do you have regular lab work to monitor your medications?: Yes  Type of Regular Lab Work: CBC, BMP (OB Glucose Test)  Lab Compliance Barriers: No    Nutritional Status  Diet: Regular  Meal Plan 24 Hour Recall: Breakfast, Lunch, Dinner, Snack  Meal Plan 24 Hour Recall - Breakfast: 2 packets of grits, eggs, sausage  Meal Plan 24 Hour Recall - Lunch: Red beans and rice and sausage  Meal Plan 24 Hour Recall - Dinner: Granola bar  Meal Plan 24 Hour Recall - Snack: Fruit, chips. Drinks: water, Melo's zero sugar drinks  Current nutritional status an area of need that is impacting patient's ability to self-manage diabetes?: No    Additional Social History    Support  Does anyone support you with your diabetes care?: yes  Who supports you?: self  Who takes you to your medical appointments?: self  Does the current support meet the patient's needs?: Yes  Is Support an area impacting ability to self-manage diabetes?: No    Access to Mass Media & Technology  Does the patient have access to any of the following devices or technologies?: Smart phone  Media or technology needs impacting ability to self-manage diabetes?: No    Cognitive/Behavioral Health  Alert and Oriented: Yes  Difficulty Thinking: No  Requires Prompting: No  Requires assistance for routine expression?: No  Cognitive or behavioral barriers impacting ability to self-manage diabetes?: No    Communication  Language preference: English  Hearing Problems: No  Vision Problems: No  Communication needs impacting ability to self-manage diabetes?: No    Health Literacy  Preferred Learning Method: Face to Face, Reading Materials  Health literacy needs impacting ability to  self-manage diabetes?: No    Diabetes Self-Management Skills Assessment    Diabetes Disease Process/Treatment Options  Diabetes Disease Process/Treatment Options: Skills Assessment Completed: No  Deferred due to:: Time    Nutrition/Healthy Eating  Challenges to healthy eating:: portion control  Method of carbohydrate measurement:: no method  Nutrition/Healthy Eating Skills Assessment Completed:: Yes  Assessment indicates:: Knowledge deficit, Instruction Needed  Area of need?: Yes    Physical Activity/Exercise  Patient's daily activity level:: lightly active  Patient formally exercises outside of work.: yes  Exercise Type: walking  Intensity: Low  Patient can identify forms of physical activity.: yes  Stated forms of physical activity:: any movement performed by muscles that uses energy  Physical Activity/Exercise Skills Assessment Completed: : Yes  Assessment indicates:: Adequate understanding  Area of need?: No    Medications  Patient is able to describe current diabetes management routine.: no (Pt currently not prescribed diabetes medications)  Medication Skills Assessment Completed:: Yes  Assessment indicates:: Adequate understanding  Area of need?: No    Home Blood Glucose Monitoring  Patient states that blood sugar is checked at home daily.: no  Reasons for not monitoring:: needs training  Home Blood Glucose Monitoring Skills Assessment Completed: : Yes  Assessment indicates:: Knowledge deficit, Instruction Needed  Area of need?: Yes    Acute Complications  Acute Complications Skills Assessment Completed: : No  Deffered due to:: Time    Chronic Complications  Patient is taking statin?: No  Chronic Complications Skills Assessment Completed: : No  Deferred due to:: Time    Psychosocial/Coping  Psychosocial/Coping Skills Assessment Completed: : No  Deffered due to:: Time    Diabetes Self Support Plan    Assessment Summary and Plan    Based on today's diabetes care assessment, the following areas of need were  identified:      Social 5/2/2023   Support No   Access to [employer] No   Cognitive/Behavioral Health No   Communication No   Health Literacy No        Clinical 5/2/2023   Lab Compliance No   Nutritional Status No        Diabetes Self-Management Skills 5/2/2023   Nutrition/Healthy Eating Yes, see care plan.   Physical Activity/Exercise No   Medication No   Home Blood Glucose Monitoring Yes, see care plan.      Today's interventions were provided through individual discussion, instruction, and written materials were provided.      Patient verbalized understanding of instruction and written materials.  Pt was able to return back demonstration of instructions today. Patient understood key points, needs reinforcement and further instruction.     Diabetes Self-Management Care Plan:    Today's Diabetes Self-Management Care Plan was developed with Vidya's input. Vidya has agreed to work toward the following goal(s) to improve his/her overall diabetes control.      Care Plan: Diabetes Management   Updates made since 4/2/2023 12:00 AM        Problem: Healthy Eating         Goal: Eat 2-3 meals daily with 30-45g/2-3 servings of Carbohydrate for breakfast, 45-60g/3-4 servings of Carbohydrate for lunch and dinner. Limit snacking in between meal to 1-2 serving (15-30 grams).    Start Date: 5/2/2023   Expected End Date: 8/2/2023   This Visit's Progress: Deferred   Priority: High   Barriers: Knowledge deficit   Note:    Reviewed meal planning and general nutritional counseling with regards to diabetes management. Meal habits reviewed. Reviewed basic Carbohydrate Counting principles--Food groups, label reading, use of dry measuring cups for accurate portion sizes       Task: Reviewed the sources and role of Carbohydrate, Protein, and Fat and how each nutrient impacts blood sugar. Completed 5/2/2023        Task: Provided visual examples using dry measuring cups, food models, and other familiar objects such as computer  mouse, deck or cards, tennis ball etc. to help with visualization of portions. Completed 5/2/2023        Task: Explained how to count carbohydrates using the food label and the use of dry measuring cups for accurate carb counting. Completed 5/2/2023        Task: Review the importance of balancing carbohydrates with each meal using portion control techniques to count servings of carbohydrate and label reading to identify serving size and amount of total carbs per serving. Completed 5/2/2023        Problem: Blood Glucose Self-Monitoring         Goal: Patient agrees to check and record blood sugars 4 times per day.    Start Date: 5/2/2023   Expected End Date: 8/2/2023   This Visit's Progress: Deferred   Priority: Medium   Barriers: Lack of Supplies   Note:    Pt states has not picked up supplies from pharmacy. Patient was trained to use the OneTouch verio reflect glucose meter. Instructed how to set lancet device and the dial. Explained that the numbers on the dial will determine the depth of the needle stick. Suggested starting at 3. Instructed that test strip is then placed into glucometer, to wait for drop signal then apply blood sample. After test is completed, remove the test strip and dispose in secured container. The glucometer will turn off.  Instructed to check 4 times per day, before meals and at bedtime. BG logs were provided. Patient verbalized understanding of all instructions. Patient encouraged to document glucose results and bring them to every clinic visit       Task: Provided patient with a meter today and sent Rx request to provider to send to patients pharmacy. Completed 5/2/2023        Task: Reviewed the importance of self-monitoring blood glucose and keeping logs. Completed 5/2/2023        Task: Instructed on how to self-monitor blood glucose using a home glucometer, how to properly dispose of used strips and lancets after use, and how to appropriately store meter and supplies. Completed 5/2/2023         Task: Provided patient with blood glucose logs, reviewed appropriate timing and frequency to SMBG, education on parameters on when to notify provider and advised patient to bring logs to all appts with PCP/Endocrinologist/Diabetes Care Specialist. Completed 5/2/2023        Task: Discussed ways to minimize pain when monitoring blood glucose. Completed 5/2/2023          Follow Up Plan     Follow up in about 4 weeks (around 5/30/2023) for 1-month F/U.    Today's care plan and follow up schedule was discussed with patient.  Vidya verbalized understanding of the care plan, goals, and agrees to follow up plan.        The patient was encouraged to communicate with his/her health care provider/physician and care team regarding his/her condition(s) and treatment.  I provided the patient with my contact information today and encouraged to contact me via phone or [facility] Patient Portal as needed.     Length of Visit   Total Time: 45 Minutes

## 2023-05-02 NOTE — PROGRESS NOTES
Reason for Visit   Routine Prenatal Visit    HPI   27 y.o., at 29w4d by Estimated Date of Delivery: 7/13/23    Patient feels well today, no complaints.  Has not yet checked Bgs, was unable to  monitor until today at her pharmacy.     Contractions: No   Bleeding: No   Loss of fluid: No   Fetal movement: Yes    Nausea: No     Vomiting: No   Headache: no     Pregnancy dating, labs, ultrasound reports, prenatal testing, and problem list; prior records and results; and available outside records were reviewed and updated in EMR.        Current Outpatient Medications:     blood-glucose meter kit, Use as instructed, Disp: 1 each, Rfl: 0    cephALEXin (KEFLEX) 500 MG capsule, Take by mouth every 8 (eight) hours., Disp: , Rfl:     COMPLETENATE 29 mg iron- 1 mg Chew, Take 1 tablet by mouth., Disp: , Rfl:    Exam   /77   Wt 78.1 kg (172 lb 2.9 oz)   LMP 09/28/2022 (Approximate)   BMI 35.99 kg/m²     GENERAL: No acute distress  ABD: Gravid  Fundal height: 29  FHR: 158  SVE: n/a    Assessment and Plan   29 weeks gestation of pregnancy      - PRANAY 7/13/2023 by 9 week US   - PNLs: WNL  - carrier screen: neg   - aneuploidy screen: patient decided not to proceed after discussion with apolinar   - Flu: did not get this year, does not desire (counseled previously)  - Covid: did not get, does not desire (counseled previously)   - msAFP: ordered, did not complete  - MFM US: anatomy 3/11 wnl, repeat incomplete  [ ] MFM scan and consult ordered for incomplete heart views, scheduled 5/18/23  - 1hr GTT: 354 > diabetes ed scheduled for tomorrow   - CBC: Hgb 11.5, platelets 351  - Rhogam: O pos  - Tdap: will consider PP   - consents: signed 3/2023  - connected moms: pending      gDM (A1)  - 1hr 354   - diabetes ed appointment tomorrow  - test kit rx sent to pharmacy, patient reports she has not been able to  yet   - discussed possible need for medication (insulin) pending blood glucose monitoring for upcoming weeks,  growth scan and NSTs will be scheduled   - delivery plan pending glucose control        labor precautions given  Follow-up: 2 weeks

## 2023-05-04 ENCOUNTER — PATIENT MESSAGE (OUTPATIENT)
Dept: OTHER | Facility: OTHER | Age: 28
End: 2023-05-04
Payer: MEDICAID

## 2023-05-15 ENCOUNTER — ROUTINE PRENATAL (OUTPATIENT)
Dept: OBSTETRICS AND GYNECOLOGY | Facility: CLINIC | Age: 28
End: 2023-05-15
Payer: MEDICAID

## 2023-05-15 VITALS
SYSTOLIC BLOOD PRESSURE: 124 MMHG | BODY MASS INDEX: 36.12 KG/M2 | WEIGHT: 172.81 LBS | DIASTOLIC BLOOD PRESSURE: 82 MMHG

## 2023-05-15 DIAGNOSIS — Z3A.31 31 WEEKS GESTATION OF PREGNANCY: Primary | ICD-10-CM

## 2023-05-15 DIAGNOSIS — O24.410 DIET CONTROLLED GESTATIONAL DIABETES MELLITUS (GDM) IN THIRD TRIMESTER: ICD-10-CM

## 2023-05-15 PROCEDURE — 99213 OFFICE O/P EST LOW 20 MIN: CPT | Mod: TH,S$PBB,, | Performed by: STUDENT IN AN ORGANIZED HEALTH CARE EDUCATION/TRAINING PROGRAM

## 2023-05-15 PROCEDURE — 99213 PR OFFICE/OUTPT VISIT, EST, LEVL III, 20-29 MIN: ICD-10-PCS | Mod: TH,S$PBB,, | Performed by: STUDENT IN AN ORGANIZED HEALTH CARE EDUCATION/TRAINING PROGRAM

## 2023-05-15 PROCEDURE — 99999 PR PBB SHADOW E&M-EST. PATIENT-LVL II: CPT | Mod: PBBFAC,,, | Performed by: STUDENT IN AN ORGANIZED HEALTH CARE EDUCATION/TRAINING PROGRAM

## 2023-05-15 PROCEDURE — 99999 PR PBB SHADOW E&M-EST. PATIENT-LVL II: ICD-10-PCS | Mod: PBBFAC,,, | Performed by: STUDENT IN AN ORGANIZED HEALTH CARE EDUCATION/TRAINING PROGRAM

## 2023-05-15 PROCEDURE — 99212 OFFICE O/P EST SF 10 MIN: CPT | Mod: PBBFAC,TH,PO | Performed by: STUDENT IN AN ORGANIZED HEALTH CARE EDUCATION/TRAINING PROGRAM

## 2023-05-15 NOTE — PROGRESS NOTES
Reason for Visit   Routine Prenatal Visit    HPI   27 y.o., at 31w4d by Estimated Date of Delivery: 7/13/23    Patient feels well today, no complaints.  Has been checking Bgs but did not bring her log book. Reports post meal 2 hour are in low 200s (218, 220 she can recall) and fasting this AM was 174.     Contractions: No   Bleeding: No   Loss of fluid: No   Fetal movement: Yes    Nausea: No     Vomiting: No   Headache: no     Pregnancy dating, labs, ultrasound reports, prenatal testing, and problem list; prior records and results; and available outside records were reviewed and updated in EMR.        Current Outpatient Medications:     blood-glucose meter kit, Use as instructed, Disp: 1 each, Rfl: 0    cephALEXin (KEFLEX) 500 MG capsule, Take by mouth every 8 (eight) hours., Disp: , Rfl:     COMPLETENATE 29 mg iron- 1 mg Chew, Take 1 tablet by mouth., Disp: , Rfl:    Exam   /82   Wt 78.4 kg (172 lb 13.5 oz)   LMP 09/28/2022 (Approximate)   BMI 36.12 kg/m²     GENERAL: No acute distress  ABD: Gravid  Fundal height: 34  FHR: 140  SVE: n/a    Assessment and Plan   31 weeks gestation of pregnancy    Diet controlled gestational diabetes mellitus (GDM) in third trimester      - PRANAY 7/13/2023 by 9 week US   - PNLs: WNL  - carrier screen: neg   - aneuploidy screen: patient decided not to proceed after discussion with apolinar   - Flu: did not get this year, does not desire (counseled previously)  - Covid: did not get, does not desire (counseled previously)   - msAFP: ordered, did not complete  - MFM US: anatomy 3/11 wnl, repeat incomplete  [ ] MFM scan and consult ordered for incomplete heart views, scheduled 5/18/23  - 1hr GTT: 354 > diabetes ed first visit 5/2/23  - CBC: Hgb 11.5, platelets 351  - Rhogam: O pos  - Tdap: will consider PP   - consents: signed 3/2023  - repeat HIV, RPR: will complete at future visit   - Contraception: desires pills   - Pediatrician: will schedule after delivery   - Feeding plan:  breast and formula (pump ordered)   - GBS: 35-36wga  - Labor anesthesia: considering      gDM (A1)  - 1hr 354   - no blood sugar log today, has been checking for 10d, remembers fasting 170s and PC 20s  - discussed possible need for insulin pending blood glucose monitoring for two weeks   - NSTs ordered  - delivery plan/timing discussed with patient today, pending glucose control        labor precautions given  Follow-up: 2 weeks

## 2023-05-18 ENCOUNTER — OFFICE VISIT (OUTPATIENT)
Dept: MATERNAL FETAL MEDICINE | Facility: CLINIC | Age: 28
End: 2023-05-18
Payer: MEDICAID

## 2023-05-18 ENCOUNTER — PATIENT MESSAGE (OUTPATIENT)
Dept: OTHER | Facility: OTHER | Age: 28
End: 2023-05-18
Payer: MEDICAID

## 2023-05-18 ENCOUNTER — PROCEDURE VISIT (OUTPATIENT)
Dept: MATERNAL FETAL MEDICINE | Facility: CLINIC | Age: 28
End: 2023-05-18
Payer: MEDICAID

## 2023-05-18 VITALS
SYSTOLIC BLOOD PRESSURE: 126 MMHG | BODY MASS INDEX: 35.68 KG/M2 | HEIGHT: 58 IN | DIASTOLIC BLOOD PRESSURE: 82 MMHG | WEIGHT: 170 LBS

## 2023-05-18 DIAGNOSIS — O24.410 DIET CONTROLLED GESTATIONAL DIABETES MELLITUS (GDM) IN THIRD TRIMESTER: ICD-10-CM

## 2023-05-18 DIAGNOSIS — Z3A.27 27 WEEKS GESTATION OF PREGNANCY: ICD-10-CM

## 2023-05-18 DIAGNOSIS — Z36.4 ULTRASOUND FOR ANTENATAL SCREENING FOR FETAL GROWTH RESTRICTION: ICD-10-CM

## 2023-05-18 DIAGNOSIS — O35.9XX0 FETAL ABNORMALITY AFFECTING MANAGEMENT OF MOTHER, SINGLE OR UNSPECIFIED FETUS: Primary | ICD-10-CM

## 2023-05-18 DIAGNOSIS — Z36.2 ENCOUNTER FOR FOLLOW-UP ULTRASOUND OF FETAL ANATOMY: ICD-10-CM

## 2023-05-18 DIAGNOSIS — Z3A.32 32 WEEKS GESTATION OF PREGNANCY: ICD-10-CM

## 2023-05-18 PROCEDURE — 76816 OB US FOLLOW-UP PER FETUS: CPT | Mod: PBBFAC,PO | Performed by: OBSTETRICS & GYNECOLOGY

## 2023-05-18 PROCEDURE — 3074F PR MOST RECENT SYSTOLIC BLOOD PRESSURE < 130 MM HG: ICD-10-PCS | Mod: CPTII,,, | Performed by: OBSTETRICS & GYNECOLOGY

## 2023-05-18 PROCEDURE — 3079F PR MOST RECENT DIASTOLIC BLOOD PRESSURE 80-89 MM HG: ICD-10-PCS | Mod: CPTII,,, | Performed by: OBSTETRICS & GYNECOLOGY

## 2023-05-18 PROCEDURE — 3008F BODY MASS INDEX DOCD: CPT | Mod: CPTII,,, | Performed by: OBSTETRICS & GYNECOLOGY

## 2023-05-18 PROCEDURE — 3008F PR BODY MASS INDEX (BMI) DOCUMENTED: ICD-10-PCS | Mod: CPTII,,, | Performed by: OBSTETRICS & GYNECOLOGY

## 2023-05-18 PROCEDURE — 99212 OFFICE O/P EST SF 10 MIN: CPT | Mod: 25,S$PBB,TH, | Performed by: OBSTETRICS & GYNECOLOGY

## 2023-05-18 PROCEDURE — 99999 PR PBB SHADOW E&M-EST. PATIENT-LVL III: ICD-10-PCS | Mod: PBBFAC,,, | Performed by: OBSTETRICS & GYNECOLOGY

## 2023-05-18 PROCEDURE — 3074F SYST BP LT 130 MM HG: CPT | Mod: CPTII,,, | Performed by: OBSTETRICS & GYNECOLOGY

## 2023-05-18 PROCEDURE — 3079F DIAST BP 80-89 MM HG: CPT | Mod: CPTII,,, | Performed by: OBSTETRICS & GYNECOLOGY

## 2023-05-18 PROCEDURE — 1159F MED LIST DOCD IN RCRD: CPT | Mod: CPTII,,, | Performed by: OBSTETRICS & GYNECOLOGY

## 2023-05-18 PROCEDURE — 99999 PR PBB SHADOW E&M-EST. PATIENT-LVL III: CPT | Mod: PBBFAC,,, | Performed by: OBSTETRICS & GYNECOLOGY

## 2023-05-18 PROCEDURE — 1159F PR MEDICATION LIST DOCUMENTED IN MEDICAL RECORD: ICD-10-PCS | Mod: CPTII,,, | Performed by: OBSTETRICS & GYNECOLOGY

## 2023-05-18 PROCEDURE — 99213 OFFICE O/P EST LOW 20 MIN: CPT | Mod: PBBFAC,TH,PO | Performed by: OBSTETRICS & GYNECOLOGY

## 2023-05-18 PROCEDURE — 76816 PR  US,PREGNANT UTERUS,F/U,TRANSABD APP: ICD-10-PCS | Mod: 26,S$PBB,, | Performed by: OBSTETRICS & GYNECOLOGY

## 2023-05-18 PROCEDURE — 76816 OB US FOLLOW-UP PER FETUS: CPT | Mod: 26,S$PBB,, | Performed by: OBSTETRICS & GYNECOLOGY

## 2023-05-18 PROCEDURE — 99212 PR OFFICE/OUTPT VISIT, EST, LEVL II, 10-19 MIN: ICD-10-PCS | Mod: 25,S$PBB,TH, | Performed by: OBSTETRICS & GYNECOLOGY

## 2023-05-18 RX ORDER — BLOOD-GLUCOSE METER
EACH MISCELLANEOUS
Status: ON HOLD | COMMUNITY
Start: 2023-04-29 | End: 2023-06-26

## 2023-05-19 ENCOUNTER — TELEPHONE (OUTPATIENT)
Dept: OBSTETRICS AND GYNECOLOGY | Facility: CLINIC | Age: 28
End: 2023-05-19
Payer: MEDICAID

## 2023-05-19 DIAGNOSIS — Z3A.32 32 WEEKS GESTATION OF PREGNANCY: Primary | ICD-10-CM

## 2023-05-19 NOTE — TELEPHONE ENCOUNTER
----- Message from Claudia Gómez MD sent at 5/19/2023  9:35 AM CDT -----  Regarding: schedule growth scan  Please schedule patient for growth scan in 1 month (at 36 weeks gestation) thanks!

## 2023-05-29 ENCOUNTER — ROUTINE PRENATAL (OUTPATIENT)
Dept: OBSTETRICS AND GYNECOLOGY | Facility: CLINIC | Age: 28
End: 2023-05-29
Payer: MEDICAID

## 2023-05-29 VITALS
WEIGHT: 170.88 LBS | BODY MASS INDEX: 35.71 KG/M2 | DIASTOLIC BLOOD PRESSURE: 72 MMHG | SYSTOLIC BLOOD PRESSURE: 117 MMHG

## 2023-05-29 DIAGNOSIS — Z3A.33 33 WEEKS GESTATION OF PREGNANCY: Primary | ICD-10-CM

## 2023-05-29 DIAGNOSIS — O24.410 DIET CONTROLLED GESTATIONAL DIABETES MELLITUS (GDM) IN THIRD TRIMESTER: ICD-10-CM

## 2023-05-29 PROCEDURE — 99999 PR PBB SHADOW E&M-EST. PATIENT-LVL II: ICD-10-PCS | Mod: PBBFAC,,, | Performed by: STUDENT IN AN ORGANIZED HEALTH CARE EDUCATION/TRAINING PROGRAM

## 2023-05-29 PROCEDURE — 99212 OFFICE O/P EST SF 10 MIN: CPT | Mod: PBBFAC,TH,PO | Performed by: STUDENT IN AN ORGANIZED HEALTH CARE EDUCATION/TRAINING PROGRAM

## 2023-05-29 PROCEDURE — 99999 PR PBB SHADOW E&M-EST. PATIENT-LVL II: CPT | Mod: PBBFAC,,, | Performed by: STUDENT IN AN ORGANIZED HEALTH CARE EDUCATION/TRAINING PROGRAM

## 2023-05-29 PROCEDURE — 99212 PR OFFICE/OUTPT VISIT, EST, LEVL II, 10-19 MIN: ICD-10-PCS | Mod: TH,S$PBB,, | Performed by: STUDENT IN AN ORGANIZED HEALTH CARE EDUCATION/TRAINING PROGRAM

## 2023-05-29 PROCEDURE — 99212 OFFICE O/P EST SF 10 MIN: CPT | Mod: TH,S$PBB,, | Performed by: STUDENT IN AN ORGANIZED HEALTH CARE EDUCATION/TRAINING PROGRAM

## 2023-05-29 NOTE — PROGRESS NOTES
Reason for Visit   Routine Prenatal Visit    HPI   28 y.o., at 33w4d by Estimated Date of Delivery: 7/13/23    Patient feels well today, no complaints. Feeling fatigued lately. Brought BG log today, 4/5 fasting elevated and 5/10 PCs elevated. Continues to work on changing her diet.     Contractions: Yes, irregular    Bleeding: No   Loss of fluid: No   Fetal movement: Yes    Nausea: No     Vomiting: No   Headache: no     Pregnancy dating, labs, ultrasound reports, prenatal testing, and problem list; prior records and results; and available outside records were reviewed and updated in EMR.        Current Outpatient Medications:     blood-glucose meter kit, Use as instructed, Disp: 1 each, Rfl: 0    COMPLETENATE 29 mg iron- 1 mg Chew, Take 1 tablet by mouth., Disp: , Rfl:     TRUE METRIX GLUCOSE METER Misc, USE AS INSTRUCTED, Disp: , Rfl:    Exam   /72   Wt 77.5 kg (170 lb 13.7 oz)   LMP 09/28/2022 (Approximate)   BMI 35.71 kg/m²     GENERAL: No acute distress  ABD: Gravid  Fundal height: 34  FHR: 165  SVE: n/a    Assessment and Plan   33 weeks gestation of pregnancy    Diet controlled gestational diabetes mellitus (GDM) in third trimester      - PRANAY 7/13/2023 by 9 week US   - PNLs: WNL  - carrier screen: neg   - aneuploidy screen: patient decided not to proceed after discussion with apolinar   - Flu: did not get this year, does not desire (counseled previously)  - Covid: did not get, does not desire (counseled previously)   - msAFP: ordered, did not complete  - MFM US: anatomy 3/11 wnl, repeat incomplete  5/18: 1989g 35%, MVP 6.7, BPP 8/8   Follow up growth scheduled 6/15  - 1hr GTT: 354 > diabetes ed first visit 5/2/23, follow up scheduled 5/31/23  - CBC: Hgb 11.5, platelets 351  - Rhogam: O pos  - Tdap: will consider PP   - consents: signed 3/2023  - repeat HIV, RPR: will complete at future visit   - Contraception: desires pills   - Pediatrician: will schedule after delivery   - Feeding plan: breast and  formula (pump ordered)   - GBS: 35-36wga  - Labor anesthesia: considering      gDM (A1)  - 1hr 354   - majority of fasting Bgs elevated, half of PCs elevated. Discussed likely need to start insulin today, will follow up with patient Wednesday after DE visit to review recommendations   - NSTs ordered, will get scheduled  - delivery plan/timing discussed with patient today, pending glucose control        labor precautions given  Follow-up: 2 weeks

## 2023-06-02 ENCOUNTER — PATIENT MESSAGE (OUTPATIENT)
Dept: OBSTETRICS AND GYNECOLOGY | Facility: CLINIC | Age: 28
End: 2023-06-02
Payer: MEDICAID

## 2023-06-07 ENCOUNTER — PATIENT MESSAGE (OUTPATIENT)
Dept: OBSTETRICS AND GYNECOLOGY | Facility: CLINIC | Age: 28
End: 2023-06-07
Payer: MEDICAID

## 2023-06-07 ENCOUNTER — TELEPHONE (OUTPATIENT)
Dept: OBSTETRICS AND GYNECOLOGY | Facility: CLINIC | Age: 28
End: 2023-06-07
Payer: MEDICAID

## 2023-06-07 DIAGNOSIS — O24.410 DIET CONTROLLED GESTATIONAL DIABETES MELLITUS (GDM) IN THIRD TRIMESTER: Primary | ICD-10-CM

## 2023-06-07 RX ORDER — INSULIN LISPRO 100 [IU]/ML
6 INJECTION, SOLUTION INTRAVENOUS; SUBCUTANEOUS
Qty: 8 EACH | Refills: 0 | Status: ON HOLD | OUTPATIENT
Start: 2023-06-07 | End: 2023-06-26

## 2023-06-07 RX ORDER — INSULIN DETEMIR 100 [IU]/ML
18 INJECTION, SOLUTION SUBCUTANEOUS NIGHTLY
Qty: 3 EACH | Refills: 0 | Status: ON HOLD | OUTPATIENT
Start: 2023-06-07 | End: 2023-06-26

## 2023-06-07 NOTE — TELEPHONE ENCOUNTER
----- Message from Claudia Gómez MD sent at 6/7/2023  8:46 AM CDT -----  Regarding: NSTs  Patient needs to be scheduled for weekly NSTs, please do asap thanks!

## 2023-06-08 ENCOUNTER — PATIENT MESSAGE (OUTPATIENT)
Dept: OTHER | Facility: OTHER | Age: 28
End: 2023-06-08
Payer: MEDICAID

## 2023-06-09 ENCOUNTER — HOSPITAL ENCOUNTER (OUTPATIENT)
Dept: OBSTETRICS AND GYNECOLOGY | Facility: HOSPITAL | Age: 28
Discharge: HOME | End: 2023-06-09
Attending: STUDENT IN AN ORGANIZED HEALTH CARE EDUCATION/TRAINING PROGRAM
Payer: MEDICAID

## 2023-06-09 VITALS
RESPIRATION RATE: 16 BRPM | SYSTOLIC BLOOD PRESSURE: 132 MMHG | OXYGEN SATURATION: 99 % | HEART RATE: 86 BPM | DIASTOLIC BLOOD PRESSURE: 87 MMHG

## 2023-06-09 DIAGNOSIS — O24.410 DIET CONTROLLED GESTATIONAL DIABETES MELLITUS (GDM) IN THIRD TRIMESTER: Primary | ICD-10-CM

## 2023-06-09 PROCEDURE — 59025 FETAL NON-STRESS TEST: CPT

## 2023-06-12 ENCOUNTER — ROUTINE PRENATAL (OUTPATIENT)
Dept: OBSTETRICS AND GYNECOLOGY | Facility: CLINIC | Age: 28
End: 2023-06-12
Payer: MEDICAID

## 2023-06-12 VITALS
SYSTOLIC BLOOD PRESSURE: 116 MMHG | BODY MASS INDEX: 36.54 KG/M2 | DIASTOLIC BLOOD PRESSURE: 76 MMHG | WEIGHT: 174.81 LBS

## 2023-06-12 DIAGNOSIS — Z3A.35 35 WEEKS GESTATION OF PREGNANCY: Primary | ICD-10-CM

## 2023-06-12 PROCEDURE — 99212 OFFICE O/P EST SF 10 MIN: CPT | Mod: PBBFAC,PO | Performed by: STUDENT IN AN ORGANIZED HEALTH CARE EDUCATION/TRAINING PROGRAM

## 2023-06-12 PROCEDURE — 99999 PR PBB SHADOW E&M-EST. PATIENT-LVL II: ICD-10-PCS | Mod: PBBFAC,,, | Performed by: STUDENT IN AN ORGANIZED HEALTH CARE EDUCATION/TRAINING PROGRAM

## 2023-06-12 PROCEDURE — 99213 PR OFFICE/OUTPT VISIT, EST, LEVL III, 20-29 MIN: ICD-10-PCS | Mod: TH,S$PBB,, | Performed by: STUDENT IN AN ORGANIZED HEALTH CARE EDUCATION/TRAINING PROGRAM

## 2023-06-12 PROCEDURE — 99999 PR PBB SHADOW E&M-EST. PATIENT-LVL II: CPT | Mod: PBBFAC,,, | Performed by: STUDENT IN AN ORGANIZED HEALTH CARE EDUCATION/TRAINING PROGRAM

## 2023-06-12 PROCEDURE — 87147 CULTURE TYPE IMMUNOLOGIC: CPT | Performed by: STUDENT IN AN ORGANIZED HEALTH CARE EDUCATION/TRAINING PROGRAM

## 2023-06-12 PROCEDURE — 87081 CULTURE SCREEN ONLY: CPT | Performed by: STUDENT IN AN ORGANIZED HEALTH CARE EDUCATION/TRAINING PROGRAM

## 2023-06-12 PROCEDURE — 99213 OFFICE O/P EST LOW 20 MIN: CPT | Mod: TH,S$PBB,, | Performed by: STUDENT IN AN ORGANIZED HEALTH CARE EDUCATION/TRAINING PROGRAM

## 2023-06-12 NOTE — PROGRESS NOTES
Reason for Visit   Routine Prenatal Visit    HPI   28 y.o., at 35w4d by Estimated Date of Delivery: 7/13/23    Patient feels well today, no complaints. Has not picked up insulin yet, will go get tomorrow. Did not bring glucose log, fasting sugars reported to be 144, 160, 170s. PC sugars 130-140s.     Contractions: No   Bleeding: No   Loss of fluid: No   Fetal movement: Yes    Nausea: No     Vomiting: No   Headache: no     Pregnancy dating, labs, ultrasound reports, prenatal testing, and problem list; prior records and results; and available outside records were reviewed and updated in EMR.        Current Outpatient Medications:     blood-glucose meter kit, Use as instructed, Disp: 1 each, Rfl: 0    COMPLETENATE 29 mg iron- 1 mg Chew, Take 1 tablet by mouth., Disp: , Rfl:     insulin detemir U-100, Levemir, (LEVEMIR FLEXPEN) 100 unit/mL (3 mL) InPn pen, Inject 18 Units into the skin every evening., Disp: 3 each, Rfl: 0    insulin lispro (HUMALOG KWIKPEN INSULIN) 100 unit/mL pen, Inject 6 Units into the skin 3 (three) times daily before meals., Disp: 8 each, Rfl: 0    TRUE METRIX GLUCOSE METER Misc, USE AS INSTRUCTED, Disp: , Rfl:    Exam   /76   Wt 79.3 kg (174 lb 13.2 oz)   LMP 09/28/2022 (Approximate)   BMI 36.54 kg/m²     GENERAL: No acute distress  ABD: Gravid  Fundal height: 36  FHR: 155  SVE: n/a    Assessment and Plan   35 weeks gestation of pregnancy  -     STREP B SCREEN, VAGINAL / RECTAL        - PRANAY 7/13/2023 by 9 week US   - PNLs: WNL  - carrier screen: neg   - aneuploidy screen: patient decided not to proceed after discussion with apolinar   - Flu: did not get this year, does not desire (counseled previously)  - Covid: did not get, does not desire (counseled previously)   - msAFP: ordered, did not complete  - MFM US: anatomy 3/11 wnl, repeat incomplete  5/18: 1989g 35%, MVP 6.7, BPP 8/8   Follow up growth scheduled 6/15  - 1hr GTT: 354 > diabetes ed first visit 5/2/23, follow up scheduled  23  - CBC: Hgb 11.5, platelets 351  - Rhogam: O pos  - Tdap: will consider PP   - consents: signed 3/2023  - repeat HIV, RPR: will complete at future visit   - Contraception: desires pills   - Pediatrician: will schedule after delivery   - Feeding plan: breast and formula (pump ordered)   - GBS: collected today   - Labor anesthesia: considering      gDM (A2)  - 1hr 354   - patient missed follow up with DM educator, will try to reschedule  - NSTs scheduled weekly   - will start insulin, patient has pens and reviewed how to use today in clinic: weight based dosing 36u total daily dose   Levemir 18 units every night   Humalog 6 units with meals  - reviewed BG goals <95 fasting, <140 1hr or <120 2hr after meals   - if low <60, counseled to eat small carb/protein snack (milk, cracker+peanut butter, etc)  - delivery plan/timing discussed with patient today, pending glucose control (well controlled 39-39w6d)   - growth scan 6/15 scheduled        labor precautions given  Follow-up: 1 week

## 2023-06-15 ENCOUNTER — PATIENT MESSAGE (OUTPATIENT)
Dept: OBSTETRICS AND GYNECOLOGY | Facility: CLINIC | Age: 28
End: 2023-06-15
Payer: MEDICAID

## 2023-06-15 ENCOUNTER — PROCEDURE VISIT (OUTPATIENT)
Dept: MATERNAL FETAL MEDICINE | Facility: CLINIC | Age: 28
End: 2023-06-15
Payer: MEDICAID

## 2023-06-15 DIAGNOSIS — Z3A.32 32 WEEKS GESTATION OF PREGNANCY: ICD-10-CM

## 2023-06-15 LAB — BACTERIA SPEC AEROBE CULT: ABNORMAL

## 2023-06-15 PROCEDURE — 76819 FETAL BIOPHYS PROFIL W/O NST: CPT | Mod: 26,S$PBB,, | Performed by: OBSTETRICS & GYNECOLOGY

## 2023-06-15 PROCEDURE — 76816 US OB/GYN PROCEDURE (VIEWPOINT): ICD-10-PCS | Mod: 26,S$PBB,, | Performed by: OBSTETRICS & GYNECOLOGY

## 2023-06-15 PROCEDURE — 76819 US OB/GYN PROCEDURE (VIEWPOINT): ICD-10-PCS | Mod: 26,S$PBB,, | Performed by: OBSTETRICS & GYNECOLOGY

## 2023-06-15 PROCEDURE — 76816 OB US FOLLOW-UP PER FETUS: CPT | Mod: PBBFAC,PO | Performed by: OBSTETRICS & GYNECOLOGY

## 2023-06-19 ENCOUNTER — ROUTINE PRENATAL (OUTPATIENT)
Dept: OBSTETRICS AND GYNECOLOGY | Facility: CLINIC | Age: 28
End: 2023-06-19
Payer: MEDICAID

## 2023-06-19 VITALS
BODY MASS INDEX: 36.86 KG/M2 | WEIGHT: 176.38 LBS | DIASTOLIC BLOOD PRESSURE: 82 MMHG | SYSTOLIC BLOOD PRESSURE: 128 MMHG

## 2023-06-19 DIAGNOSIS — O24.414 INSULIN CONTROLLED GESTATIONAL DIABETES MELLITUS (GDM) IN THIRD TRIMESTER: ICD-10-CM

## 2023-06-19 DIAGNOSIS — Z3A.36 36 WEEKS GESTATION OF PREGNANCY: Primary | ICD-10-CM

## 2023-06-19 PROCEDURE — 99999 PR PBB SHADOW E&M-EST. PATIENT-LVL II: CPT | Mod: PBBFAC,,, | Performed by: STUDENT IN AN ORGANIZED HEALTH CARE EDUCATION/TRAINING PROGRAM

## 2023-06-19 PROCEDURE — 99213 OFFICE O/P EST LOW 20 MIN: CPT | Mod: TH,S$PBB,, | Performed by: STUDENT IN AN ORGANIZED HEALTH CARE EDUCATION/TRAINING PROGRAM

## 2023-06-19 PROCEDURE — 99999 PR PBB SHADOW E&M-EST. PATIENT-LVL II: ICD-10-PCS | Mod: PBBFAC,,, | Performed by: STUDENT IN AN ORGANIZED HEALTH CARE EDUCATION/TRAINING PROGRAM

## 2023-06-19 PROCEDURE — 99213 PR OFFICE/OUTPT VISIT, EST, LEVL III, 20-29 MIN: ICD-10-PCS | Mod: TH,S$PBB,, | Performed by: STUDENT IN AN ORGANIZED HEALTH CARE EDUCATION/TRAINING PROGRAM

## 2023-06-19 PROCEDURE — 99212 OFFICE O/P EST SF 10 MIN: CPT | Mod: PBBFAC,TH,PO | Performed by: STUDENT IN AN ORGANIZED HEALTH CARE EDUCATION/TRAINING PROGRAM

## 2023-06-19 NOTE — PROGRESS NOTES
Reason for Visit   Routine Prenatal Visit    HPI   28 y.o., at 36w4d by Estimated Date of Delivery: 7/13/23    Patient feels well today, no complaints. Has levemir, has not started yet. Has not been able to get humalog yet as [facility] didn't have it. Morning Bgs are still in 140s, PCs reported in 150s, does not have log today.     Contractions: Occasional    Bleeding: No   Loss of fluid: No   Fetal movement: Yes    Nausea: No     Vomiting: No   Headache: no     Pregnancy dating, labs, ultrasound reports, prenatal testing, and problem list; prior records and results; and available outside records were reviewed and updated in EMR.        Current Outpatient Medications:     insulin detemir U-100, Levemir, (LEVEMIR FLEXPEN) 100 unit/mL (3 mL) InPn pen, Inject 18 Units into the skin every evening., Disp: 3 each, Rfl: 0    blood-glucose meter kit, Use as instructed (Patient not taking: Reported on 6/19/2023), Disp: 1 each, Rfl: 0    COMPLETENATE 29 mg iron- 1 mg Chew, Take 1 tablet by mouth., Disp: , Rfl:     insulin lispro (HUMALOG KWIKPEN INSULIN) 100 unit/mL pen, Inject 6 Units into the skin 3 (three) times daily before meals. (Patient not taking: Reported on 6/19/2023), Disp: 8 each, Rfl: 0    TRUE METRIX GLUCOSE METER Misc, USE AS INSTRUCTED, Disp: , Rfl:    Exam   /82   Wt 80 kg (176 lb 5.9 oz)   LMP 09/28/2022 (Approximate)   BMI 36.86 kg/m²     GENERAL: No acute distress  ABD: Gravid  Fundal height: 37  FHR: 147  SVE: n/a  Breech/head maternal right     Assessment and Plan   36 weeks gestation of pregnancy    Insulin controlled gestational diabetes mellitus (GDM) in third trimester      - PRANAY 7/13/2023 by 9 week US   - PNLs: WNL  - carrier screen: neg   - aneuploidy screen: patient decided not to proceed after discussion with apolinar   - Flu: did not get this year, does not desire (counseled previously)  - Covid: did not get, does not desire (counseled previously)   - msAFP: ordered, did not complete  - MFM  US: anatomy 3/11 wnl, repeat incomplete  : 1989g 35%, MVP 6.7, BPP 8/8   growth 6/15: 2846g 40% MVP 6 BREECH  - 1hr GTT: 354 > diabetes ed first visit 23, follow up scheduled 23  - CBC: Hgb 11.5, platelets 351  - Rhogam: O pos  - Tdap: will consider PP   - consents: signed 3/2023  - repeat HIV, RPR: will complete at future visit   - Contraception: desires pills   - Pediatrician: will schedule after delivery   - Feeding plan: breast and formula (pump ordered)   - GBS: POS  - Labor anesthesia: considering   - : BREECH presentation, discussed ECV vs scheduled CS today with patient and risks/benefits of each. Patient will review with partner and family and let MD know by end of week what she would like to do.      gDM (A2)  - 1hr 354   - patient missed follow up with DM educator, will try to reschedule  - NSTs scheduled weekly   - will start insulin, patient has pens and reviewed how to use today in clinic: weight based dosing 36u total daily dose   Levemir 18 units every night   Humalog 6 units with meals  - reviewed BG goals <95 fasting, <140 1hr or <120 2hr after meals   - if low <60, counseled to eat small carb/protein snack (milk, cracker+peanut butter, etc)  - delivery plan/timing discussed with patient today, pending glucose control (well controlled 39-39w6d)   - : has not started insulin, discuss use again today and patient will start tonight. Will bring BG log next visit to review         labor precautions given  Follow-up: 1 week

## 2023-06-23 ENCOUNTER — ANESTHESIA EVENT (OUTPATIENT)
Dept: OBSTETRICS AND GYNECOLOGY | Facility: HOSPITAL | Age: 28
End: 2023-06-23
Payer: MEDICAID

## 2023-06-23 ENCOUNTER — HOSPITAL ENCOUNTER (OUTPATIENT)
Dept: OBSTETRICS AND GYNECOLOGY | Facility: HOSPITAL | Age: 28
Discharge: HOME | End: 2023-06-23
Attending: STUDENT IN AN ORGANIZED HEALTH CARE EDUCATION/TRAINING PROGRAM
Payer: MEDICAID

## 2023-06-23 ENCOUNTER — HOSPITAL ENCOUNTER (INPATIENT)
Facility: HOSPITAL | Age: 28
LOS: 3 days | Discharge: HOME | End: 2023-06-26
Attending: STUDENT IN AN ORGANIZED HEALTH CARE EDUCATION/TRAINING PROGRAM | Admitting: STUDENT IN AN ORGANIZED HEALTH CARE EDUCATION/TRAINING PROGRAM
Payer: MEDICAID

## 2023-06-23 ENCOUNTER — ANESTHESIA (OUTPATIENT)
Dept: OBSTETRICS AND GYNECOLOGY | Facility: HOSPITAL | Age: 28
End: 2023-06-23
Payer: MEDICAID

## 2023-06-23 VITALS
HEART RATE: 87 BPM | SYSTOLIC BLOOD PRESSURE: 148 MMHG | DIASTOLIC BLOOD PRESSURE: 89 MMHG | OXYGEN SATURATION: 100 % | RESPIRATION RATE: 18 BRPM

## 2023-06-23 DIAGNOSIS — O16.3 ELEVATED BLOOD PRESSURE AFFECTING PREGNANCY IN THIRD TRIMESTER, ANTEPARTUM: ICD-10-CM

## 2023-06-23 DIAGNOSIS — O24.410 DIET CONTROLLED GESTATIONAL DIABETES MELLITUS (GDM) IN THIRD TRIMESTER: ICD-10-CM

## 2023-06-23 LAB
ABO + RH BLD: NORMAL
ALBUMIN SERPL BCP-MCNC: 2.7 G/DL (ref 3.5–5.2)
ALP SERPL-CCNC: 231 U/L (ref 55–135)
ALT SERPL W/O P-5'-P-CCNC: 9 U/L (ref 10–44)
ANION GAP SERPL CALC-SCNC: 9 MMOL/L (ref 8–16)
AST SERPL-CCNC: 18 U/L (ref 10–40)
BASOPHILS # BLD AUTO: 0.02 K/UL (ref 0–0.2)
BASOPHILS NFR BLD: 0.4 % (ref 0–1.9)
BILIRUB SERPL-MCNC: 0.4 MG/DL (ref 0.1–1)
BLD GP AB SCN CELLS X3 SERPL QL: NORMAL
BUN SERPL-MCNC: 6 MG/DL (ref 6–20)
CALCIUM SERPL-MCNC: 9 MG/DL (ref 8.7–10.5)
CHLORIDE SERPL-SCNC: 108 MMOL/L (ref 95–110)
CO2 SERPL-SCNC: 19 MMOL/L (ref 23–29)
CREAT SERPL-MCNC: 0.7 MG/DL (ref 0.5–1.4)
CREAT UR-MCNC: 95.5 MG/DL (ref 15–325)
DIFFERENTIAL METHOD: ABNORMAL
EOSINOPHIL # BLD AUTO: 0.1 K/UL (ref 0–0.5)
EOSINOPHIL NFR BLD: 0.9 % (ref 0–8)
ERYTHROCYTE [DISTWIDTH] IN BLOOD BY AUTOMATED COUNT: 14.6 % (ref 11.5–14.5)
EST. GFR  (NO RACE VARIABLE): >60 ML/MIN/1.73 M^2
GLUCOSE SERPL-MCNC: 141 MG/DL (ref 70–110)
HCT VFR BLD AUTO: 40.9 % (ref 37–48.5)
HGB BLD-MCNC: 12.4 G/DL (ref 12–16)
HIV1+2 IGG SERPL QL IA.RAPID: NORMAL
IMM GRANULOCYTES # BLD AUTO: 0.02 K/UL (ref 0–0.04)
IMM GRANULOCYTES NFR BLD AUTO: 0.4 % (ref 0–0.5)
LYMPHOCYTES # BLD AUTO: 1.6 K/UL (ref 1–4.8)
LYMPHOCYTES NFR BLD: 28.7 % (ref 18–48)
MCH RBC QN AUTO: 22.5 PG (ref 27–31)
MCHC RBC AUTO-ENTMCNC: 30.3 G/DL (ref 32–36)
MCV RBC AUTO: 74 FL (ref 82–98)
MONOCYTES # BLD AUTO: 0.5 K/UL (ref 0.3–1)
MONOCYTES NFR BLD: 9.6 % (ref 4–15)
NEUTROPHILS # BLD AUTO: 3.3 K/UL (ref 1.8–7.7)
NEUTROPHILS NFR BLD: 60 % (ref 38–73)
NRBC BLD-RTO: 0 /100 WBC
PLATELET # BLD AUTO: 232 K/UL (ref 150–450)
PMV BLD AUTO: 11.2 FL (ref 9.2–12.9)
POTASSIUM SERPL-SCNC: 4.2 MMOL/L (ref 3.5–5.1)
PROT SERPL-MCNC: 6.6 G/DL (ref 6–8.4)
PROT UR-MCNC: 26 MG/DL (ref 0–15)
PROT/CREAT UR: 0.27 MG/G{CREAT} (ref 0–0.2)
RBC # BLD AUTO: 5.52 M/UL (ref 4–5.4)
SODIUM SERPL-SCNC: 136 MMOL/L (ref 136–145)
WBC # BLD AUTO: 5.51 K/UL (ref 3.9–12.7)

## 2023-06-23 PROCEDURE — 86592 SYPHILIS TEST NON-TREP QUAL: CPT | Performed by: STUDENT IN AN ORGANIZED HEALTH CARE EDUCATION/TRAINING PROGRAM

## 2023-06-23 PROCEDURE — 25000003 PHARM REV CODE 250: Performed by: STUDENT IN AN ORGANIZED HEALTH CARE EDUCATION/TRAINING PROGRAM

## 2023-06-23 PROCEDURE — 86703 HIV-1/HIV-2 1 RESULT ANTBDY: CPT | Performed by: STUDENT IN AN ORGANIZED HEALTH CARE EDUCATION/TRAINING PROGRAM

## 2023-06-23 PROCEDURE — 36004724 HC OB OR TIME LEV III - 1ST 15 MIN: Performed by: STUDENT IN AN ORGANIZED HEALTH CARE EDUCATION/TRAINING PROGRAM

## 2023-06-23 PROCEDURE — 59514 PRA REAN DELIVERY ONLY: ICD-10-PCS | Mod: QZ,,, | Performed by: NURSE ANESTHETIST, CERTIFIED REGISTERED

## 2023-06-23 PROCEDURE — 71000033 HC RECOVERY, INTIAL HOUR: Performed by: STUDENT IN AN ORGANIZED HEALTH CARE EDUCATION/TRAINING PROGRAM

## 2023-06-23 PROCEDURE — 36004725 HC OB OR TIME LEV III - EA ADD 15 MIN: Performed by: STUDENT IN AN ORGANIZED HEALTH CARE EDUCATION/TRAINING PROGRAM

## 2023-06-23 PROCEDURE — 51702 INSERT TEMP BLADDER CATH: CPT

## 2023-06-23 PROCEDURE — 37000009 HC ANESTHESIA EA ADD 15 MINS: Performed by: STUDENT IN AN ORGANIZED HEALTH CARE EDUCATION/TRAINING PROGRAM

## 2023-06-23 PROCEDURE — 85025 COMPLETE CBC W/AUTO DIFF WBC: CPT | Performed by: STUDENT IN AN ORGANIZED HEALTH CARE EDUCATION/TRAINING PROGRAM

## 2023-06-23 PROCEDURE — 63600175 PHARM REV CODE 636 W HCPCS: Performed by: STUDENT IN AN ORGANIZED HEALTH CARE EDUCATION/TRAINING PROGRAM

## 2023-06-23 PROCEDURE — 37000008 HC ANESTHESIA 1ST 15 MINUTES: Performed by: STUDENT IN AN ORGANIZED HEALTH CARE EDUCATION/TRAINING PROGRAM

## 2023-06-23 PROCEDURE — 59514 CESAREAN DELIVERY ONLY: CPT | Mod: QZ,,, | Performed by: NURSE ANESTHETIST, CERTIFIED REGISTERED

## 2023-06-23 PROCEDURE — 71000039 HC RECOVERY, EACH ADD'L HOUR: Performed by: STUDENT IN AN ORGANIZED HEALTH CARE EDUCATION/TRAINING PROGRAM

## 2023-06-23 PROCEDURE — 25000003 PHARM REV CODE 250: Performed by: NURSE ANESTHETIST, CERTIFIED REGISTERED

## 2023-06-23 PROCEDURE — 36415 COLL VENOUS BLD VENIPUNCTURE: CPT | Performed by: STUDENT IN AN ORGANIZED HEALTH CARE EDUCATION/TRAINING PROGRAM

## 2023-06-23 PROCEDURE — 11000001 HC ACUTE MED/SURG PRIVATE ROOM

## 2023-06-23 PROCEDURE — 86900 BLOOD TYPING SEROLOGIC ABO: CPT | Performed by: STUDENT IN AN ORGANIZED HEALTH CARE EDUCATION/TRAINING PROGRAM

## 2023-06-23 PROCEDURE — 80053 COMPREHEN METABOLIC PANEL: CPT | Performed by: STUDENT IN AN ORGANIZED HEALTH CARE EDUCATION/TRAINING PROGRAM

## 2023-06-23 PROCEDURE — 63600175 PHARM REV CODE 636 W HCPCS: Performed by: NURSE ANESTHETIST, CERTIFIED REGISTERED

## 2023-06-23 PROCEDURE — 59025 FETAL NON-STRESS TEST: CPT | Mod: 76

## 2023-06-23 PROCEDURE — 72100002 HC LABOR CARE, 1ST 8 HOURS

## 2023-06-23 PROCEDURE — 59514 CESAREAN DELIVERY ONLY: CPT | Mod: AT,,, | Performed by: STUDENT IN AN ORGANIZED HEALTH CARE EDUCATION/TRAINING PROGRAM

## 2023-06-23 PROCEDURE — 84156 ASSAY OF PROTEIN URINE: CPT | Performed by: STUDENT IN AN ORGANIZED HEALTH CARE EDUCATION/TRAINING PROGRAM

## 2023-06-23 PROCEDURE — 59514 PR CESAREAN DELIVERY ONLY: ICD-10-PCS | Mod: AT,,, | Performed by: STUDENT IN AN ORGANIZED HEALTH CARE EDUCATION/TRAINING PROGRAM

## 2023-06-23 RX ORDER — OXYTOCIN/RINGER'S LACTATE 30/500 ML
334 PLASTIC BAG, INJECTION (ML) INTRAVENOUS ONCE
Status: DISCONTINUED | OUTPATIENT
Start: 2023-06-23 | End: 2023-06-26 | Stop reason: HOSPADM

## 2023-06-23 RX ORDER — METHYLERGONOVINE MALEATE 0.2 MG/ML
200 INJECTION INTRAVENOUS
Status: DISCONTINUED | OUTPATIENT
Start: 2023-06-23 | End: 2023-06-26 | Stop reason: HOSPADM

## 2023-06-23 RX ORDER — ACETAMINOPHEN 325 MG/1
650 TABLET ORAL EVERY 6 HOURS
Status: DISPENSED | OUTPATIENT
Start: 2023-06-24 | End: 2023-06-25

## 2023-06-23 RX ORDER — ONDANSETRON 2 MG/ML
4 INJECTION INTRAMUSCULAR; INTRAVENOUS EVERY 6 HOURS PRN
Status: ACTIVE | OUTPATIENT
Start: 2023-06-23 | End: 2023-06-24

## 2023-06-23 RX ORDER — MISOPROSTOL 200 UG/1
800 TABLET ORAL ONCE AS NEEDED
Status: DISCONTINUED | OUTPATIENT
Start: 2023-06-23 | End: 2023-06-26 | Stop reason: HOSPADM

## 2023-06-23 RX ORDER — FAMOTIDINE 10 MG/ML
20 INJECTION INTRAVENOUS
Status: DISCONTINUED | OUTPATIENT
Start: 2023-06-23 | End: 2023-06-26 | Stop reason: HOSPADM

## 2023-06-23 RX ORDER — SODIUM CHLORIDE, SODIUM LACTATE, POTASSIUM CHLORIDE, CALCIUM CHLORIDE 600; 310; 30; 20 MG/100ML; MG/100ML; MG/100ML; MG/100ML
INJECTION, SOLUTION INTRAVENOUS CONTINUOUS
Status: DISCONTINUED | OUTPATIENT
Start: 2023-06-23 | End: 2023-06-26 | Stop reason: HOSPADM

## 2023-06-23 RX ORDER — OXYTOCIN/RINGER'S LACTATE 30/500 ML
334 PLASTIC BAG, INJECTION (ML) INTRAVENOUS ONCE
Status: DISCONTINUED | OUTPATIENT
Start: 2023-06-23 | End: 2023-06-23 | Stop reason: SDUPTHER

## 2023-06-23 RX ORDER — LABETALOL HYDROCHLORIDE 5 MG/ML
20 INJECTION, SOLUTION INTRAVENOUS ONCE AS NEEDED
Status: COMPLETED | OUTPATIENT
Start: 2023-06-23 | End: 2023-06-23

## 2023-06-23 RX ORDER — PROCHLORPERAZINE EDISYLATE 5 MG/ML
5 INJECTION INTRAMUSCULAR; INTRAVENOUS EVERY 6 HOURS PRN
Status: DISCONTINUED | OUTPATIENT
Start: 2023-06-23 | End: 2023-06-26 | Stop reason: HOSPADM

## 2023-06-23 RX ORDER — CARBOPROST TROMETHAMINE 250 UG/ML
250 INJECTION, SOLUTION INTRAMUSCULAR
Status: DISCONTINUED | OUTPATIENT
Start: 2023-06-23 | End: 2023-06-26 | Stop reason: HOSPADM

## 2023-06-23 RX ORDER — MUPIROCIN 20 MG/G
OINTMENT TOPICAL
Status: DISCONTINUED | OUTPATIENT
Start: 2023-06-23 | End: 2023-06-26 | Stop reason: HOSPADM

## 2023-06-23 RX ORDER — OXYTOCIN 10 [USP'U]/ML
INJECTION, SOLUTION INTRAMUSCULAR; INTRAVENOUS
Status: DISCONTINUED | OUTPATIENT
Start: 2023-06-23 | End: 2023-06-23

## 2023-06-23 RX ORDER — DIPHENHYDRAMINE HCL 25 MG
25 CAPSULE ORAL EVERY 4 HOURS PRN
Status: DISCONTINUED | OUTPATIENT
Start: 2023-06-23 | End: 2023-06-26 | Stop reason: HOSPADM

## 2023-06-23 RX ORDER — DEXAMETHASONE SODIUM PHOSPHATE 4 MG/ML
INJECTION, SOLUTION INTRA-ARTICULAR; INTRALESIONAL; INTRAMUSCULAR; INTRAVENOUS; SOFT TISSUE
Status: DISCONTINUED | OUTPATIENT
Start: 2023-06-23 | End: 2023-06-23

## 2023-06-23 RX ORDER — NALBUPHINE HYDROCHLORIDE 10 MG/ML
2.5 INJECTION, SOLUTION INTRAMUSCULAR; INTRAVENOUS; SUBCUTANEOUS ONCE AS NEEDED
Status: DISCONTINUED | OUTPATIENT
Start: 2023-06-23 | End: 2023-06-26 | Stop reason: HOSPADM

## 2023-06-23 RX ORDER — MUPIROCIN 20 MG/G
OINTMENT TOPICAL
Status: CANCELLED | OUTPATIENT
Start: 2023-06-23

## 2023-06-23 RX ORDER — HYDRALAZINE HYDROCHLORIDE 20 MG/ML
10 INJECTION INTRAMUSCULAR; INTRAVENOUS ONCE AS NEEDED
Status: DISCONTINUED | OUTPATIENT
Start: 2023-06-23 | End: 2023-06-26 | Stop reason: HOSPADM

## 2023-06-23 RX ORDER — BUPIVACAINE HYDROCHLORIDE 2.5 MG/ML
INJECTION, SOLUTION EPIDURAL; INFILTRATION; INTRACAUDAL
Status: DISPENSED
Start: 2023-06-23 | End: 2023-06-24

## 2023-06-23 RX ORDER — MORPHINE SULFATE 0.5 MG/ML
INJECTION, SOLUTION EPIDURAL; INTRATHECAL; INTRAVENOUS
Status: DISCONTINUED | OUTPATIENT
Start: 2023-06-23 | End: 2023-06-23

## 2023-06-23 RX ORDER — LABETALOL 200 MG/1
200 TABLET, FILM COATED ORAL EVERY 12 HOURS
Status: DISCONTINUED | OUTPATIENT
Start: 2023-06-23 | End: 2023-06-26 | Stop reason: HOSPADM

## 2023-06-23 RX ORDER — OXYCODONE HYDROCHLORIDE 5 MG/1
10 TABLET ORAL EVERY 4 HOURS PRN
Status: ACTIVE | OUTPATIENT
Start: 2023-06-23 | End: 2023-06-24

## 2023-06-23 RX ORDER — SODIUM CITRATE AND CITRIC ACID MONOHYDRATE 334; 500 MG/5ML; MG/5ML
30 SOLUTION ORAL
Status: DISCONTINUED | OUTPATIENT
Start: 2023-06-23 | End: 2023-06-23 | Stop reason: SDUPTHER

## 2023-06-23 RX ORDER — MUPIROCIN 20 MG/G
OINTMENT TOPICAL 2 TIMES DAILY
Status: DISCONTINUED | OUTPATIENT
Start: 2023-06-23 | End: 2023-06-26 | Stop reason: HOSPADM

## 2023-06-23 RX ORDER — OXYTOCIN 10 [USP'U]/ML
10 INJECTION, SOLUTION INTRAMUSCULAR; INTRAVENOUS ONCE AS NEEDED
Status: DISCONTINUED | OUTPATIENT
Start: 2023-06-23 | End: 2023-06-26 | Stop reason: HOSPADM

## 2023-06-23 RX ORDER — OXYTOCIN/RINGER'S LACTATE 30/500 ML
95 PLASTIC BAG, INJECTION (ML) INTRAVENOUS ONCE
Status: DISCONTINUED | OUTPATIENT
Start: 2023-06-23 | End: 2023-06-26 | Stop reason: HOSPADM

## 2023-06-23 RX ORDER — MISOPROSTOL 200 UG/1
800 TABLET ORAL
Status: DISCONTINUED | OUTPATIENT
Start: 2023-06-23 | End: 2023-06-26 | Stop reason: HOSPADM

## 2023-06-23 RX ORDER — OXYCODONE AND ACETAMINOPHEN 10; 325 MG/1; MG/1
1 TABLET ORAL EVERY 4 HOURS PRN
Status: DISCONTINUED | OUTPATIENT
Start: 2023-06-23 | End: 2023-06-26 | Stop reason: HOSPADM

## 2023-06-23 RX ORDER — PRENATAL WITH FERROUS FUM AND FOLIC ACID 3080; 920; 120; 400; 22; 1.84; 3; 20; 10; 1; 12; 200; 27; 25; 2 [IU]/1; [IU]/1; MG/1; [IU]/1; MG/1; MG/1; MG/1; MG/1; MG/1; MG/1; UG/1; MG/1; MG/1; MG/1; MG/1
1 TABLET ORAL DAILY
Status: DISCONTINUED | OUTPATIENT
Start: 2023-06-24 | End: 2023-06-26 | Stop reason: HOSPADM

## 2023-06-23 RX ORDER — FENTANYL CITRATE 50 UG/ML
INJECTION, SOLUTION INTRAMUSCULAR; INTRAVENOUS
Status: DISCONTINUED | OUTPATIENT
Start: 2023-06-23 | End: 2023-06-23

## 2023-06-23 RX ORDER — ACETAMINOPHEN 10 MG/ML
INJECTION, SOLUTION INTRAVENOUS
Status: DISCONTINUED | OUTPATIENT
Start: 2023-06-23 | End: 2023-06-23

## 2023-06-23 RX ORDER — OXYTOCIN/RINGER'S LACTATE 30/500 ML
95 PLASTIC BAG, INJECTION (ML) INTRAVENOUS ONCE AS NEEDED
Status: COMPLETED | OUTPATIENT
Start: 2023-06-23 | End: 2023-06-23

## 2023-06-23 RX ORDER — BUPIVACAINE HYDROCHLORIDE 7.5 MG/ML
INJECTION, SOLUTION INTRASPINAL
Status: DISCONTINUED | OUTPATIENT
Start: 2023-06-23 | End: 2023-06-23

## 2023-06-23 RX ORDER — METHYLERGONOVINE MALEATE 0.2 MG/ML
200 INJECTION INTRAVENOUS
Status: DISCONTINUED | OUTPATIENT
Start: 2023-06-23 | End: 2023-06-23 | Stop reason: SDUPTHER

## 2023-06-23 RX ORDER — OXYTOCIN/RINGER'S LACTATE 30/500 ML
95 PLASTIC BAG, INJECTION (ML) INTRAVENOUS ONCE
Status: DISCONTINUED | OUTPATIENT
Start: 2023-06-23 | End: 2023-06-23 | Stop reason: SDUPTHER

## 2023-06-23 RX ORDER — LABETALOL HYDROCHLORIDE 5 MG/ML
80 INJECTION, SOLUTION INTRAVENOUS ONCE AS NEEDED
Status: DISCONTINUED | OUTPATIENT
Start: 2023-06-23 | End: 2023-06-26 | Stop reason: HOSPADM

## 2023-06-23 RX ORDER — CARBOPROST TROMETHAMINE 250 UG/ML
250 INJECTION, SOLUTION INTRAMUSCULAR
Status: DISCONTINUED | OUTPATIENT
Start: 2023-06-23 | End: 2023-06-23 | Stop reason: SDUPTHER

## 2023-06-23 RX ORDER — IBUPROFEN 400 MG/1
800 TABLET ORAL EVERY 8 HOURS
Status: DISCONTINUED | OUTPATIENT
Start: 2023-06-24 | End: 2023-06-24

## 2023-06-23 RX ORDER — KETOROLAC TROMETHAMINE 30 MG/ML
30 INJECTION, SOLUTION INTRAMUSCULAR; INTRAVENOUS EVERY 6 HOURS
Status: COMPLETED | OUTPATIENT
Start: 2023-06-24 | End: 2023-06-24

## 2023-06-23 RX ORDER — OXYTOCIN/RINGER'S LACTATE 30/500 ML
334 PLASTIC BAG, INJECTION (ML) INTRAVENOUS ONCE AS NEEDED
Status: DISCONTINUED | OUTPATIENT
Start: 2023-06-23 | End: 2023-06-26 | Stop reason: HOSPADM

## 2023-06-23 RX ORDER — MISOPROSTOL 200 UG/1
800 TABLET ORAL
Status: DISCONTINUED | OUTPATIENT
Start: 2023-06-23 | End: 2023-06-23 | Stop reason: SDUPTHER

## 2023-06-23 RX ORDER — ONDANSETRON HYDROCHLORIDE 2 MG/ML
INJECTION, SOLUTION INTRAMUSCULAR; INTRAVENOUS
Status: DISCONTINUED | OUTPATIENT
Start: 2023-06-23 | End: 2023-06-23

## 2023-06-23 RX ORDER — BUPIVACAINE HYDROCHLORIDE 2.5 MG/ML
20 INJECTION, SOLUTION EPIDURAL; INFILTRATION; INTRACAUDAL ONCE
Status: COMPLETED | OUTPATIENT
Start: 2023-06-23 | End: 2023-06-23

## 2023-06-23 RX ORDER — ONDANSETRON 8 MG/1
8 TABLET, ORALLY DISINTEGRATING ORAL EVERY 8 HOURS PRN
Status: DISCONTINUED | OUTPATIENT
Start: 2023-06-23 | End: 2023-06-26 | Stop reason: HOSPADM

## 2023-06-23 RX ORDER — KETOROLAC TROMETHAMINE 30 MG/ML
INJECTION, SOLUTION INTRAMUSCULAR; INTRAVENOUS
Status: DISCONTINUED | OUTPATIENT
Start: 2023-06-23 | End: 2023-06-23

## 2023-06-23 RX ORDER — SODIUM CITRATE AND CITRIC ACID MONOHYDRATE 334; 500 MG/5ML; MG/5ML
30 SOLUTION ORAL
Status: DISCONTINUED | OUTPATIENT
Start: 2023-06-23 | End: 2023-06-26 | Stop reason: HOSPADM

## 2023-06-23 RX ORDER — OXYCODONE HYDROCHLORIDE 5 MG/1
5 TABLET ORAL EVERY 4 HOURS PRN
Status: ACTIVE | OUTPATIENT
Start: 2023-06-23 | End: 2023-06-24

## 2023-06-23 RX ORDER — SODIUM CHLORIDE 0.9 % (FLUSH) 0.9 %
10 SYRINGE (ML) INJECTION
Status: DISCONTINUED | OUTPATIENT
Start: 2023-06-23 | End: 2023-06-26 | Stop reason: HOSPADM

## 2023-06-23 RX ORDER — AMOXICILLIN 250 MG
1 CAPSULE ORAL NIGHTLY PRN
Status: DISCONTINUED | OUTPATIENT
Start: 2023-06-23 | End: 2023-06-26 | Stop reason: HOSPADM

## 2023-06-23 RX ORDER — KETOROLAC TROMETHAMINE 30 MG/ML
30 INJECTION, SOLUTION INTRAMUSCULAR; INTRAVENOUS EVERY 8 HOURS
Status: DISCONTINUED | OUTPATIENT
Start: 2023-06-23 | End: 2023-06-24

## 2023-06-23 RX ORDER — CEFAZOLIN SODIUM 2 G/50ML
2 SOLUTION INTRAVENOUS
Status: COMPLETED | OUTPATIENT
Start: 2023-06-23 | End: 2023-06-23

## 2023-06-23 RX ORDER — FAMOTIDINE 10 MG/ML
20 INJECTION INTRAVENOUS
Status: DISCONTINUED | OUTPATIENT
Start: 2023-06-23 | End: 2023-06-23 | Stop reason: SDUPTHER

## 2023-06-23 RX ORDER — LABETALOL HYDROCHLORIDE 5 MG/ML
40 INJECTION, SOLUTION INTRAVENOUS ONCE AS NEEDED
Status: DISCONTINUED | OUTPATIENT
Start: 2023-06-23 | End: 2023-06-26 | Stop reason: HOSPADM

## 2023-06-23 RX ORDER — DOCUSATE SODIUM 100 MG/1
200 CAPSULE, LIQUID FILLED ORAL 2 TIMES DAILY
Status: DISCONTINUED | OUTPATIENT
Start: 2023-06-23 | End: 2023-06-26 | Stop reason: HOSPADM

## 2023-06-23 RX ORDER — ADHESIVE BANDAGE
30 BANDAGE TOPICAL 2 TIMES DAILY PRN
Status: DISCONTINUED | OUTPATIENT
Start: 2023-06-24 | End: 2023-06-26 | Stop reason: HOSPADM

## 2023-06-23 RX ORDER — BISACODYL 10 MG
10 SUPPOSITORY, RECTAL RECTAL ONCE AS NEEDED
Status: DISCONTINUED | OUTPATIENT
Start: 2023-06-23 | End: 2023-06-26 | Stop reason: HOSPADM

## 2023-06-23 RX ORDER — OXYCODONE AND ACETAMINOPHEN 5; 325 MG/1; MG/1
1 TABLET ORAL EVERY 4 HOURS PRN
Status: DISCONTINUED | OUTPATIENT
Start: 2023-06-23 | End: 2023-06-26 | Stop reason: HOSPADM

## 2023-06-23 RX ORDER — SIMETHICONE 80 MG
1 TABLET,CHEWABLE ORAL EVERY 6 HOURS PRN
Status: DISCONTINUED | OUTPATIENT
Start: 2023-06-23 | End: 2023-06-26 | Stop reason: HOSPADM

## 2023-06-23 RX ADMIN — ACETAMINOPHEN 1000 MG: 10 INJECTION, SOLUTION INTRAVENOUS at 06:06

## 2023-06-23 RX ADMIN — FENTANYL CITRATE 10 MCG: 0.05 INJECTION, SOLUTION INTRAMUSCULAR; INTRAVENOUS at 05:06

## 2023-06-23 RX ADMIN — CEFAZOLIN SODIUM 2 G: 2 SOLUTION INTRAVENOUS at 05:06

## 2023-06-23 RX ADMIN — FAMOTIDINE 20 MG: 10 INJECTION, SOLUTION INTRAVENOUS at 01:06

## 2023-06-23 RX ADMIN — LABETALOL HYDROCHLORIDE 200 MG: 200 TABLET, FILM COATED ORAL at 08:06

## 2023-06-23 RX ADMIN — MORPHINE SULFATE 0.1 MG: 0.5 INJECTION, SOLUTION EPIDURAL; INTRATHECAL; INTRAVENOUS at 05:06

## 2023-06-23 RX ADMIN — LABETALOL HYDROCHLORIDE 20 MG: 5 INJECTION INTRAVENOUS at 01:06

## 2023-06-23 RX ADMIN — SODIUM CHLORIDE, POTASSIUM CHLORIDE, SODIUM LACTATE AND CALCIUM CHLORIDE 1000 ML: 600; 310; 30; 20 INJECTION, SOLUTION INTRAVENOUS at 01:06

## 2023-06-23 RX ADMIN — Medication 95 MILLI-UNITS/MIN: at 07:06

## 2023-06-23 RX ADMIN — PHENYLEPHRINE HYDROCHLORIDE 0.5 MCG/KG/MIN: 10 INJECTION INTRAVENOUS at 05:06

## 2023-06-23 RX ADMIN — KETOROLAC TROMETHAMINE 30 MG: 30 INJECTION, SOLUTION INTRAMUSCULAR; INTRAVENOUS at 06:06

## 2023-06-23 RX ADMIN — SODIUM CHLORIDE, SODIUM LACTATE, POTASSIUM CHLORIDE, AND CALCIUM CHLORIDE: .6; .31; .03; .02 INJECTION, SOLUTION INTRAVENOUS at 05:06

## 2023-06-23 RX ADMIN — BUPIVACAINE HYDROCHLORIDE 50 MG: 2.5 INJECTION, SOLUTION EPIDURAL; INFILTRATION; INTRACAUDAL; PERINEURAL at 06:06

## 2023-06-23 RX ADMIN — OXYTOCIN 10 UNITS: 10 INJECTION INTRAVENOUS at 05:06

## 2023-06-23 RX ADMIN — SODIUM CITRATE AND CITRIC ACID MONOHYDRATE 30 ML: 500; 334 SOLUTION ORAL at 01:06

## 2023-06-23 RX ADMIN — ONDANSETRON 4 MG: 2 INJECTION INTRAMUSCULAR; INTRAVENOUS at 05:06

## 2023-06-23 RX ADMIN — BUPIVACAINE HYDROCHLORIDE IN DEXTROSE 1.4 ML: 7.5 INJECTION, SOLUTION SUBARACHNOID at 05:06

## 2023-06-23 RX ADMIN — DEXAMETHASONE SODIUM PHOSPHATE 4 MG: 4 INJECTION, SOLUTION INTRA-ARTICULAR; INTRALESIONAL; INTRAMUSCULAR; INTRAVENOUS; SOFT TISSUE at 06:06

## 2023-06-23 NOTE — H&P
Labor and Delivery History and Physical     Chief Complaint:   Chief Complaint   Patient presents with    Hypertension        History of Present Illness     Vidya Grewal is a 28 y.o. female.   at 37w1d   Estimated Date of Delivery: 23. EDC is based on 9 week US    Pt presents to L&D for NST, found to have elevated Bps 140-150s/90-100s. Patient is asymptomatic, feels well today. Having some contractions but not too painful. Denies vaginal bleeding, loss of fluid,  HA, blurry vision, shortness of breath, extremity swelling. Reports good fetal movement.     Review of Systems   Constitutional:  Negative for fever and malaise/fatigue.   Eyes:  Negative for blurred vision.   Respiratory:  Negative for cough and shortness of breath.    Cardiovascular:  Negative for chest pain and palpitations.   Gastrointestinal:  Negative for abdominal pain, constipation, diarrhea, heartburn, nausea and vomiting.   Genitourinary:  Negative for dysuria, flank pain, frequency, hematuria and urgency.   Skin:  Negative for itching and rash.   Neurological:  Negative for dizziness, weakness and headaches.         Other complications with this pregnancy include: A2DM (insulin), breech presentation             Medical History    OBHx:   Year     Delivery Type     GA     Sex    Weight        Complications  OB History          1    Para        Term                AB        Living             SAB        IAB        Ectopic        Multiple        Live Births                      GynHx:  Patient's last menstrual period was 2022 (approximate).     PMHx:  No past medical history on file.    PSHx:  Past Surgical History:   Procedure Laterality Date    MULTIPLE TOOTH EXTRACTIONS      WISDOM TOOTH EXTRACTION         SocHx:  Social History     Socioeconomic History    Marital status: Single   Tobacco Use    Smoking status: Former     Types: Cigarettes    Smokeless tobacco: Never   Substance and Sexual Activity    Alcohol  use: No    Drug use: Not Currently     Types: Marijuana    Sexual activity: Yes     Partners: Male        FamHx:  Family History   Problem Relation Age of Onset    Diabetes Maternal Grandmother     Diabetes Mother     Breast cancer Neg Hx     Colon cancer Neg Hx     Ovarian cancer Neg Hx        Meds:  Current Outpatient Medications   Medication Instructions    blood-glucose meter kit Use as instructed    COMPLETENATE 29 mg iron- 1 mg Chew 1 tablet, Oral    insulin lispro (HUMALOG KWIKPEN INSULIN) 6 Units, Subcutaneous, 3 times daily before meals    LEVEMIR FLEXPEN 18 Units, Subcutaneous, Nightly    TRUE METRIX GLUCOSE METER Misc USE AS INSTRUCTED        Allergies:  Review of patient's allergies indicates:  No Known Allergies        Physical Exam  BP (!) 153/104   Pulse 91   LMP 2022 (Approximate)   SpO2 96%     Constitutional: She is a gravid, alert, cooperative  Cardiovascular: RRR  Pulmonary: effort normal  Abdominal: gravid, soft, NT  Extremities: no edema           Fetal Assessment  Baseline: 150  Variability: mod  Accelerations: +  Decelerations: -    Contractions:  [place]: irregular              Assessment/Plan  28 y.o.  with IUP@ 37w1d is here for 1LTCS for elevated BP, gHTN      Elevated BP: gHTN    - Bps now 150s/100s, will give IV lab as needed for severe range Bps  - PreE labs wnl, PC 0.27  - discussed Bps, dx with patient and indication for delivery due to gHTN. All questions answered  - breech presentation with planned CS, patient confirmed desire to proceed with CS  - Explained to patient the risks and benefits of proceeding with . Risks of csection including, but not limited, to bleeding, infection, damage to surrounding organs, fetal injury or death, maternal injury or death. Explained to patient that in the event of excessive bleeding a blood transfusion may be required, but if bleeding is uncontrolled, embolization or emergent c-hyst may be necessary. Explained to patient risk  of infection which we try to control with sterile instrumentation and use of intra-op IV antibiotics. Finally, there is a risk of injury to surrounding organs such as bowel, ureters, bladder. Explained all of this to patient and answered questions satisfactorily. Patient responds with understanding and has no more questions at this time.  - Ancef 2g ordered             Claudia Gómez MD

## 2023-06-23 NOTE — L&D DELIVERY NOTE
Section Operative Note    Indications:  breech presentation, gestational HTN        Pre-operative Diagnosis: Intrauterine pregnancy at:37w1d   Post-operative Diagnosis: same    Procedure Type: primary low transverse  section   Procedure Date: 2023    Surgeon: Claudia Gómez MD  Assistant(s): Graciela rodriguez Assistant Tasks:  Opening and closing, retraction, dissecting tissue, and removing or altering tissue    Anesthesia: spinal         Procedure Details   The patient was seen in triage room. The risks, benefits, complications, treatment options, and expected outcomes were discussed with the patient. The patient concurred with the proposed plan, giving informed consent. The patient was taken to the operating room, identified as Vidya Grewal and the procedure verified as  Delivery. A Time Out was held and the above information confirmed.    The patient was prepped and draped in the usual sterile fashion. After verification of adequate anesthesia, the site of surgery was properly noted/marked. A low transverse incision was made with the scalpel and carried through the underlying layer of subcutaneous tissue to the fascia with the scalpel and bovie. The fascia was incised in the midline and the incision was extended laterally with the spann scissors. The superior edge of the fascial incision was grasped with the Kocher clamps and dissected off with sharp and blunt dissection. The inferior aspect of the fascial incision was grasped with the Kocher clamps and dissected off with sharp and blunt dissection.     The peritoneum was identified with Veronica clamps and entered bluntly/with Metzenbaum scissors. The peritoneal incision was then extended with good visualization of  the bladder. After ensuring no anterior wall adhesions with manual sweep, the retractor was inserted. The vesicouterine peritoneum was identified, grasped with the pickups and entered sharply with the Metzenbaum  scissors. The incision was extended laterally and the bladder flap was created digitally.  A low transverse uterine incision was made and extended in a cephalocaudal manner manually.  Fluid ruptured, clear. The infant's left leg presented and was delivered through the hysterotomy.  The infant's right lower extremity was delivered by splinting the medial aspect of the femur and sweeping the leg away from the midline.  The bilateral lower extremities were grasped with gentle traction for delivery of the hips. The infant's bony pelvis was grasped and gentle traction was used to deliver the trunk until the infant's scapulae appeared at the uterine incision site. The infant's left arm was swept across the chest from medial to lateral and delivered through the hysterotomy. The infant's right arm was delivered in a similar fashion. The infant's head was delivered with the [id number]-[id number]-[id number] maneuver, with gentle flexion of the infants mandible and gentle traction. The nose and mouth were suctioned with the suction bulb, the cord was clamped and cut. The infant was handed off to waiting pediatrician staff.     The placenta was removed manually. The uterus was then exteriorized, and cleared of all clots and debris. Uterus noted to be arcuate. The uterine incision was repaired in a running, locked fashion with 0 monocryl. A second layer of the same suture was used in an imbricating fashion. Hemostasis was observed. The tubes and ovaries appeared normal. The uterus was returned to the abdomen, retractor was removed and the gutters were cleared of all clots and debris. The peritoneum was reapproximated using running suture of 2-0 vicryl. The fascia was then reapproximated with running sutures of 0 vicryl. Bupivacaine injected into fascial incision. The subcutaneous tissue was closed with plain gut suture. The skin was reapproximated with monocryl.    Instrument, sponge, and needle counts were correct prior to the abdominal  closure and at the conclusion of the case.         Infant Delivery Info  Delivery Date/Time:  6/23/2023  @ 5:41 pm   Sex:    male   Weight:  pending     Apgars:   1 minute:   9  5 minute:  9        Findings:  Viable male infant, Apgars 9/9, clear amniotic fluid  Arcuate uterus, Normal appearing tubes, and ovaries, placenta complete    Estimated Blood Loss:    Delivery Blood Loss  06/23/23 1731 - 06/23/23 1830      Calculated QBL (Quantitative Blood Loss) (mL) Hospital Encounter 839 mL    Total  839             UOP: per anesthesia record           Total IV Fluids: Per anesthesia record           Implants: none    Specimens: none           Complications: None; patient tolerated the procedure well.           Disposition: patient room            Condition: stable            Stephanie Heaney, MD OBGYN [facility]

## 2023-06-23 NOTE — TRANSFER OF CARE
Anesthesia Transfer of Care Note    Patient: Vidya Grewal    Procedure(s) Performed: Procedure(s) (LRB):   SECTION (N/A)    Patient location: Labor and Delivery    Anesthesia Type: spinal    Transport from OR: Transported from OR on room air with adequate spontaneous ventilation    Post pain: adequate analgesia    Post assessment: no apparent anesthetic complications    Post vital signs: stable    Level of consciousness: awake, alert and oriented    Nausea/Vomiting: no nausea/vomiting    Complications: none    Transfer of care protocol was followed      Last vitals:   Visit Vitals  BP (!) 139/90   Pulse 88   Resp 18   LMP 2022 (Approximate)   SpO2 100%   Breastfeeding No

## 2023-06-23 NOTE — ANESTHESIA PREPROCEDURE EVALUATION
06/23/2023  Vidya Grewal is a 28 y.o., female.    History reviewed. No pertinent past medical history.    Past Surgical History:   Procedure Laterality Date    MULTIPLE TOOTH EXTRACTIONS      WISDOM TOOTH EXTRACTION         Family History   Problem Relation Age of Onset    Diabetes Maternal Grandmother     Diabetes Mother     Breast cancer Neg Hx     Colon cancer Neg Hx     Ovarian cancer Neg Hx        Social History     Socioeconomic History    Marital status: Single   Tobacco Use    Smoking status: Former     Types: Cigarettes    Smokeless tobacco: Never   Substance and Sexual Activity    Alcohol use: No    Drug use: Not Currently     Types: Marijuana    Sexual activity: Yes     Partners: Male       Current Facility-Administered Medications   Medication Dose Route Frequency Provider Last Rate Last Admin    carboprost injection 250 mcg  250 mcg Intramuscular On Call Procedure Claudia Gómez MD        cefazolin (ANCEF) 2 gram in dextrose 5% 50 mL IVPB (premix)  2 g Intravenous On Call Procedure Claudia Gómez MD        famotidine (PF) injection 20 mg  20 mg Intravenous On Call Procedure Claudia Gómez MD        hydrALAZINE injection 10 mg  10 mg Intravenous Once PRN Claudia Gómez MD        labetaloL injection 40 mg  40 mg Intravenous Once PRN Claudia Gómez MD        labetaloL injection 80 mg  80 mg Intravenous Once PRN Claudia Gómez MD        lactated ringers bolus 1,000 mL  1,000 mL Intravenous PRN Claudia Gómez MD        lactated ringers bolus 1,000 mL  1,000 mL Intravenous PRN Claudia Gómez  mL/hr at 06/23/23 1329 1,000 mL at 06/23/23 1329    lactated ringers infusion   Intravenous Continuous Claudia Gómez MD        lactated ringers infusion   Intravenous Continuous Claudia Gómez MD        methylergonovine injection 200 mcg  200 mcg  Intramuscular On Call Procedure Claudia Gómez MD        miSOPROStoL tablet 800 mcg  800 mcg Rectal On Call Procedure Claudia Gómez MD        mupirocin 2 % ointment   Nasal On Call Procedure Claudia Gómez MD        oxytocin 30 units in 500 mL lactated ringers infusion (non-titrating)  334 salvador-units/min Intravenous Once Claudia Gómez MD        oxytocin 30 units in 500 mL lactated ringers infusion (non-titrating)  95 salvador-units/min Intravenous Once Claudia Gómez MD        sodium citrate-citric acid 500-334 mg/5 ml solution 30 mL  30 mL Oral On Call Procedure Claudia Gómez MD           Review of patient's allergies indicates:  No Known Allergies  Pre-op Assessment    I have reviewed the Patient Summary Reports.     I have reviewed the Nursing Notes.       Review of Systems  Anesthesia Hx:  No previous Anesthesia  Neg history of prior surgery. Denies Family Hx of Anesthesia complications.    Hematology/Oncology:  Hematology Normal   Oncology Normal     EENT/Dental:EENT/Dental Normal   Cardiovascular:  Cardiovascular Normal     Pulmonary:  Pulmonary Normal    Renal/:  Renal/ Normal     Hepatic/GI:  Hepatic/GI Normal    Musculoskeletal:  Musculoskeletal Normal    OB/GYN/PEDS:  Planned Primary  Primary  is for breech presentation.   1  , Para 0    Neurological:  Neurology Normal    Endocrine:   Diabetes, gestational    Dermatological:  Skin Normal    Psych:  Psychiatric Normal           Physical Exam  General: Well nourished, Cooperative, Alert and Oriented    Airway:  Mallampati: II   Mouth Opening: Normal  TM Distance: Normal  Tongue: Normal  Neck ROM: Normal ROM    Dental:  Intact        Anesthesia Plan  Type of Anesthesia, risks & benefits discussed:    Anesthesia Type: Gen ETT, Spinal, CSE  Intra-op Monitoring Plan: Standard ASA Monitors  Post Op Pain Control Plan: multimodal analgesia  Informed Consent: Patient consented to blood products? Yes  ASA Score:  2  Day of Surgery Review of History & Physical: H&P Update referred to the surgeon/provider.    Ready For Surgery From Anesthesia Perspective.     .

## 2023-06-24 LAB
BASOPHILS # BLD AUTO: 0.02 K/UL (ref 0–0.2)
BASOPHILS NFR BLD: 0.2 % (ref 0–1.9)
DIFFERENTIAL METHOD: ABNORMAL
EOSINOPHIL # BLD AUTO: 0 K/UL (ref 0–0.5)
EOSINOPHIL NFR BLD: 0 % (ref 0–8)
ERYTHROCYTE [DISTWIDTH] IN BLOOD BY AUTOMATED COUNT: 14.6 % (ref 11.5–14.5)
HCT VFR BLD AUTO: 34.8 % (ref 37–48.5)
HGB BLD-MCNC: 10.5 G/DL (ref 12–16)
IMM GRANULOCYTES # BLD AUTO: 0.05 K/UL (ref 0–0.04)
IMM GRANULOCYTES NFR BLD AUTO: 0.5 % (ref 0–0.5)
LYMPHOCYTES # BLD AUTO: 1.1 K/UL (ref 1–4.8)
LYMPHOCYTES NFR BLD: 10 % (ref 18–48)
MCH RBC QN AUTO: 22.5 PG (ref 27–31)
MCHC RBC AUTO-ENTMCNC: 30.2 G/DL (ref 32–36)
MCV RBC AUTO: 75 FL (ref 82–98)
MONOCYTES # BLD AUTO: 0.6 K/UL (ref 0.3–1)
MONOCYTES NFR BLD: 5.8 % (ref 4–15)
NEUTROPHILS # BLD AUTO: 9.3 K/UL (ref 1.8–7.7)
NEUTROPHILS NFR BLD: 83.5 % (ref 38–73)
NRBC BLD-RTO: 0 /100 WBC
PLATELET # BLD AUTO: 249 K/UL (ref 150–450)
PMV BLD AUTO: 12.8 FL (ref 9.2–12.9)
RBC # BLD AUTO: 4.67 M/UL (ref 4–5.4)
RPR SER QL: NORMAL
WBC # BLD AUTO: 11.08 K/UL (ref 3.9–12.7)

## 2023-06-24 PROCEDURE — 85025 COMPLETE CBC W/AUTO DIFF WBC: CPT | Performed by: STUDENT IN AN ORGANIZED HEALTH CARE EDUCATION/TRAINING PROGRAM

## 2023-06-24 PROCEDURE — 11000001 HC ACUTE MED/SURG PRIVATE ROOM

## 2023-06-24 PROCEDURE — 25000003 PHARM REV CODE 250: Performed by: STUDENT IN AN ORGANIZED HEALTH CARE EDUCATION/TRAINING PROGRAM

## 2023-06-24 PROCEDURE — 25000003 PHARM REV CODE 250: Performed by: NURSE ANESTHETIST, CERTIFIED REGISTERED

## 2023-06-24 PROCEDURE — 99232 SBSQ HOSP IP/OBS MODERATE 35: CPT | Mod: ,,, | Performed by: STUDENT IN AN ORGANIZED HEALTH CARE EDUCATION/TRAINING PROGRAM

## 2023-06-24 PROCEDURE — 63600175 PHARM REV CODE 636 W HCPCS: Performed by: NURSE ANESTHETIST, CERTIFIED REGISTERED

## 2023-06-24 PROCEDURE — 99232 PR SUBSEQUENT HOSPITAL CARE,LEVL II: ICD-10-PCS | Mod: ,,, | Performed by: STUDENT IN AN ORGANIZED HEALTH CARE EDUCATION/TRAINING PROGRAM

## 2023-06-24 PROCEDURE — 36415 COLL VENOUS BLD VENIPUNCTURE: CPT | Performed by: STUDENT IN AN ORGANIZED HEALTH CARE EDUCATION/TRAINING PROGRAM

## 2023-06-24 RX ORDER — IBUPROFEN 400 MG/1
800 TABLET ORAL EVERY 8 HOURS
Status: DISCONTINUED | OUTPATIENT
Start: 2023-06-25 | End: 2023-06-26 | Stop reason: HOSPADM

## 2023-06-24 RX ADMIN — PRENATAL VIT W/ FE FUMARATE-FA TAB 27-0.8 MG 1 TABLET: 27-0.8 TAB at 08:06

## 2023-06-24 RX ADMIN — LABETALOL HYDROCHLORIDE 200 MG: 200 TABLET, FILM COATED ORAL at 08:06

## 2023-06-24 RX ADMIN — DOCUSATE SODIUM 200 MG: 100 CAPSULE, LIQUID FILLED ORAL at 08:06

## 2023-06-24 RX ADMIN — ACETAMINOPHEN 650 MG: 325 TABLET ORAL at 05:06

## 2023-06-24 RX ADMIN — KETOROLAC TROMETHAMINE 30 MG: 30 INJECTION INTRAMUSCULAR; INTRAVENOUS at 12:06

## 2023-06-24 RX ADMIN — MUPIROCIN: 20 OINTMENT TOPICAL at 08:06

## 2023-06-24 RX ADMIN — KETOROLAC TROMETHAMINE 30 MG: 30 INJECTION INTRAMUSCULAR; INTRAVENOUS at 05:06

## 2023-06-24 RX ADMIN — MUPIROCIN: 20 OINTMENT TOPICAL at 12:06

## 2023-06-24 RX ADMIN — ACETAMINOPHEN 650 MG: 325 TABLET ORAL at 11:06

## 2023-06-24 RX ADMIN — KETOROLAC TROMETHAMINE 30 MG: 30 INJECTION INTRAMUSCULAR; INTRAVENOUS at 11:06

## 2023-06-24 NOTE — PROGRESS NOTES
POSTPARTUM PROGRESS NOTE     Vidya Grewal is a 28 y.o. female POD #1 status post Primary  section (breech) at 37w1d in a pregnancy complicated by GHTN and GDM A 2 (18 qhs/6 TID). Patient is doing   well this morning. She has one episode of emesis last night but now denies nausea.  Patient reports moderate abdominal pain that is adequately relieved by oral pain medications. Lochia is mild to moderate  and decreasing. Patient has not yet voided but her kat is out and she is ambulating with no difficulty. She has passed flatus, and has not had BM.  Patient does not plan to breast feed.     Objective:       Temp:  [97.9 °F (36.6 °C)-98.2 °F (36.8 °C)] 98.1 °F (36.7 °C)  Pulse:  [] 88  Resp:  [17-19] 19  SpO2:  [88 %-100 %] 99 %  BP: (130-181)/() 136/84    General:   alert, appears stated age, and cooperative   Lungs:   Non-labored breathing   Heart:   regular rate and rhythm   Abdomen:  soft, non-tender; bowel sounds normal; no masses,  no organomegaly   Uterus:  firm located at the umblicus.    Incision: Bandage in place, % 50 strike-through noted, no additional excursion   Extremities: pedal edema 1 +     Lab Review  Recent Results (from the past 4 hour(s))   CBC auto differential    Collection Time: 23  5:31 AM   Result Value Ref Range    WBC 11.08 3.90 - 12.70 K/uL    RBC 4.67 4.00 - 5.40 M/uL    Hemoglobin 10.5 (L) 12.0 - 16.0 g/dL    Hematocrit 34.8 (L) 37.0 - 48.5 %    MCV 75 (L) 82 - 98 fL    MCH 22.5 (L) 27.0 - 31.0 pg    MCHC 30.2 (L) 32.0 - 36.0 g/dL    RDW 14.6 (H) 11.5 - 14.5 %    Platelets 249 150 - 450 K/uL    MPV 12.8 9.2 - 12.9 fL    Immature Granulocytes 0.5 0.0 - 0.5 %    Gran # (ANC) 9.3 (H) 1.8 - 7.7 K/uL    Immature Grans (Abs) 0.05 (H) 0.00 - 0.04 K/uL    Lymph # 1.1 1.0 - 4.8 K/uL    Mono # 0.6 0.3 - 1.0 K/uL    Eos # 0.0 0.0 - 0.5 K/uL    Baso # 0.02 0.00 - 0.20 K/uL    nRBC 0 0 /100 WBC    Gran % 83.5 (H) 38.0 - 73.0 %    Lymph % 10.0 (L) 18.0 - 48.0 %    Mono %  5.8 4.0 - 15.0 %    Eosinophil % 0.0 0.0 - 8.0 %    Basophil % 0.2 0.0 - 1.9 %    Differential Method Automated        I/O    Intake/Output Summary (Last 24 hours) at 2023 0805  Last data filed at 2023 0400  Gross per 24 hour   Intake 672.5 ml   Output 2464 ml   Net -1791.5 ml        Assessment:     Patient Active Problem List   Diagnosis    Delivery of pregnancy by  section        Plan:   1. Postpartum care:  - Patient doing well. Continue routine management and advances.  - Continue PO pain meds. Pain well controlled.  - Heme: H/H - 12.4/40.9 > 10.5/34.8 Excellent UOP  - Encourage ambulation  - Contraception - Per Primary OBGYN  - Lactation consultant following    2. GHTN  - BP: (130-181)/() 136/84  - Labetalol     3. GDM  - Discontinue insulin therapy  - AM Fasting glucose  - 2 HR GTT PP      Dispo: As patient meets milestones, will plan to discharge POD 2-4.    Trenton Medrano

## 2023-06-24 NOTE — LACTATION NOTE
FUNMILAYO Moreno states mother states she is interested in pumping. Rounded on mother. Discussed benefits of breastmilk for nicu baby. Discussed importance of early, frequent breast stimulation for supply. Mom staters she wants to see if anything comes out because she doesn't think she has milk. Offered to set up on medela symphony pump. Discussed importance of pumping 8+times/24 hrs and discussed realistic pump outputs. Mom states she is unsure at this time. Will think about it and let us know. Support provided. Update given to FUNMILAYO Moreno.

## 2023-06-24 NOTE — PLAN OF CARE
2200 Patient arrived via bed to room 302.  Bedside report given from Cheryl Mancilla RN.  Plan of care reviewed with patient.  Kat in place.  Aquacel dressing has moderate drainage being monitored, area marked and Dr Medrano is aware.  Will cont ot reassess wound.  BPs WNL.  Denies dizziness, SOB, headaches or blurry vision.  Patient plans on formula feeding infant, therefore declines being set up on breast pump.  Pt states she is hungry and would like to try and eat turkey sandwich.  SCDs on, IV infusing.  Call light within reach  WCTM.    2300 Pt called nurse into room.  She states she vomited however feels better and would not like any medicine.  300 cc undigested food and juice noted in emesis bag.  Will cont to reassess.  Aquacel dressing still marked with no further drainage.  WCTM.    0000 Patient denies pain, nausea, or vomiting.  Aquacel dressing still intact and no further drainage noted.  WCTM.    0400 Kat removed, 600 cc clear yellow urine noted.  No increased bleeding to aquacel dressing.  Denies nausea voming,  Wheelchaired to NICU to see infant.  Bonding observed.  0445 Wheelchaired back to room.  Awaiting first void post kat removal.  Pt aware to call nurse to asssist to bathroom.  No c/o pain.  WCTM.

## 2023-06-24 NOTE — ANESTHESIA POSTPROCEDURE EVALUATION
Anesthesia Post Evaluation    Patient: Vidya Grewal    Procedure(s) Performed: Procedure(s) (LRB):   SECTION (N/A)    Final Anesthesia Type: general      Patient location during evaluation: labor & delivery  Patient participation: Yes- Able to Participate  Level of consciousness: awake and alert  Post-procedure vital signs: reviewed and stable  Pain management: adequate  Airway patency: patent    PONV status at discharge: No PONV  Anesthetic complications: no      Cardiovascular status: blood pressure returned to baseline  Respiratory status: unassisted  Hydration status: euvolemic  Follow-up not needed.          Vitals Value Taken Time   /88 23   Temp 36.6 °C (97.9 °F) 23 184   Pulse 96 23   Resp 18 23 1841   SpO2 99 % 23   Vitals shown include unvalidated device data.      No case tracking events are documented in the log.      Pain/Gigi Score: No data recorded

## 2023-06-24 NOTE — PLAN OF CARE
VSS, NAD. POC reviewed with pt at bedside. Pt verbalized understanding. Ambulating and voiding without difficulty. Tolerating regular diet. Reports adequate pain management. Pt visits infant in NICU. Questions encouraged and answered. Will continue with POC.

## 2023-06-25 PROCEDURE — 11000001 HC ACUTE MED/SURG PRIVATE ROOM

## 2023-06-25 PROCEDURE — 25000003 PHARM REV CODE 250: Performed by: STUDENT IN AN ORGANIZED HEALTH CARE EDUCATION/TRAINING PROGRAM

## 2023-06-25 PROCEDURE — 99232 PR SUBSEQUENT HOSPITAL CARE,LEVL II: ICD-10-PCS | Mod: ,,, | Performed by: STUDENT IN AN ORGANIZED HEALTH CARE EDUCATION/TRAINING PROGRAM

## 2023-06-25 PROCEDURE — 99232 SBSQ HOSP IP/OBS MODERATE 35: CPT | Mod: ,,, | Performed by: STUDENT IN AN ORGANIZED HEALTH CARE EDUCATION/TRAINING PROGRAM

## 2023-06-25 RX ADMIN — IBUPROFEN 800 MG: 400 TABLET ORAL at 12:06

## 2023-06-25 RX ADMIN — IBUPROFEN 800 MG: 400 TABLET ORAL at 02:06

## 2023-06-25 RX ADMIN — DOCUSATE SODIUM 200 MG: 100 CAPSULE, LIQUID FILLED ORAL at 09:06

## 2023-06-25 RX ADMIN — IBUPROFEN 800 MG: 400 TABLET ORAL at 06:06

## 2023-06-25 RX ADMIN — PRENATAL VIT W/ FE FUMARATE-FA TAB 27-0.8 MG 1 TABLET: 27-0.8 TAB at 08:06

## 2023-06-25 RX ADMIN — IBUPROFEN 800 MG: 400 TABLET ORAL at 09:06

## 2023-06-25 RX ADMIN — DOCUSATE SODIUM 200 MG: 100 CAPSULE, LIQUID FILLED ORAL at 08:06

## 2023-06-25 RX ADMIN — LABETALOL HYDROCHLORIDE 200 MG: 200 TABLET, FILM COATED ORAL at 08:06

## 2023-06-25 RX ADMIN — MUPIROCIN: 20 OINTMENT TOPICAL at 08:06

## 2023-06-25 RX ADMIN — MUPIROCIN: 20 OINTMENT TOPICAL at 09:06

## 2023-06-25 RX ADMIN — LABETALOL HYDROCHLORIDE 200 MG: 200 TABLET, FILM COATED ORAL at 09:06

## 2023-06-25 NOTE — PROGRESS NOTES
POSTPARTUM PROGRESS NOTE     Vidya Grewal is a 28 y.o. female POD #1 status post Primary  section (breech) at 37w1d in a pregnancy complicated by GHTN and GDM A 2 (18 qhs/6 TID). Patient is doing   well this morning. She has one episode of emesis last night but now denies nausea.  Patient reports moderate abdominal pain that is adequately relieved by oral pain medications. Lochia is mild to moderate  and decreasing. Patient is now voiding without complication and she is ambulating with no difficulty. She has passed flatus, and has not had BM.  Patient does not plan to breast feed.     Objective:       Temp:  [98.1 °F (36.7 °C)-98.5 °F (36.9 °C)] 98.3 °F (36.8 °C)  Pulse:  [74-89] 82  Resp:  [17-19] 17  SpO2:  [98 %-99 %] 99 %  BP: (129-142)/(64-94) 134/68    General:   alert, appears stated age, and cooperative   Lungs:   Non-labored breathing   Heart:   regular rate and rhythm   Abdomen:  soft, non-tender; bowel sounds normal; no masses,  no organomegaly   Uterus:  firm located at the umblicus.    Incision: Bandage in place, % 65 strike-through noted   Extremities: pedal edema 1 +     Lab Review  No results found for this or any previous visit (from the past 4 hour(s)).      I/O    Intake/Output Summary (Last 24 hours) at 2023 0731  Last data filed at 2023 0930  Gross per 24 hour   Intake --   Output 750 ml   Net -750 ml          Assessment:     Patient Active Problem List   Diagnosis    Delivery of pregnancy by  section        Plan:   1. Postpartum care:  - Patient doing well. Continue routine management and advances.  - Continue PO pain meds. Pain well controlled.  - Heme: H/H - 12.4/40.9 > 10.5/34.8 Excellent UOP  - Encourage ambulation  - Contraception - Per Primary OBGYN  - Lactation consultant following    2. GHTN  - BP: (129-142)/(64-94) 134/68  - Labetalol     3. GDM  - Discontinue insulin therapy  - AM Fasting glucose  - 2 HR GTT PP      Dispo: As patient meets milestones, will  plan to discharge POD 2-4.    Trenton Medrano

## 2023-06-26 VITALS
HEART RATE: 98 BPM | TEMPERATURE: 99 F | RESPIRATION RATE: 18 BRPM | DIASTOLIC BLOOD PRESSURE: 93 MMHG | OXYGEN SATURATION: 99 % | SYSTOLIC BLOOD PRESSURE: 144 MMHG

## 2023-06-26 PROCEDURE — 99238 PR HOSPITAL DISCHARGE DAY,<30 MIN: ICD-10-PCS | Mod: ,,, | Performed by: STUDENT IN AN ORGANIZED HEALTH CARE EDUCATION/TRAINING PROGRAM

## 2023-06-26 PROCEDURE — 72100002 HC LABOR CARE, 1ST 8 HOURS

## 2023-06-26 PROCEDURE — 25000003 PHARM REV CODE 250: Performed by: STUDENT IN AN ORGANIZED HEALTH CARE EDUCATION/TRAINING PROGRAM

## 2023-06-26 PROCEDURE — 99238 HOSP IP/OBS DSCHRG MGMT 30/<: CPT | Mod: ,,, | Performed by: STUDENT IN AN ORGANIZED HEALTH CARE EDUCATION/TRAINING PROGRAM

## 2023-06-26 RX ORDER — IBUPROFEN 800 MG/1
800 TABLET ORAL EVERY 8 HOURS
Qty: 60 TABLET | Refills: 1 | Status: SHIPPED | OUTPATIENT
Start: 2023-06-26

## 2023-06-26 RX ORDER — ACETAMINOPHEN AND CODEINE PHOSPHATE 120; 12 MG/5ML; MG/5ML
1 SOLUTION ORAL DAILY
Qty: 84 TABLET | Refills: 3 | Status: SHIPPED | OUTPATIENT
Start: 2023-06-26 | End: 2024-06-25

## 2023-06-26 RX ORDER — LABETALOL 200 MG/1
200 TABLET, FILM COATED ORAL EVERY 12 HOURS
Qty: 60 TABLET | Refills: 1 | Status: SHIPPED | OUTPATIENT
Start: 2023-06-26 | End: 2024-06-25

## 2023-06-26 RX ORDER — OXYCODONE AND ACETAMINOPHEN 5; 325 MG/1; MG/1
1 TABLET ORAL EVERY 4 HOURS PRN
Qty: 20 TABLET | Refills: 0 | Status: SHIPPED | OUTPATIENT
Start: 2023-06-26

## 2023-06-26 RX ORDER — DOCUSATE SODIUM 100 MG/1
200 CAPSULE, LIQUID FILLED ORAL 2 TIMES DAILY
Qty: 60 CAPSULE | Refills: 1 | Status: SHIPPED | OUTPATIENT
Start: 2023-06-26

## 2023-06-26 RX ADMIN — DOCUSATE SODIUM 200 MG: 100 CAPSULE, LIQUID FILLED ORAL at 08:06

## 2023-06-26 RX ADMIN — LABETALOL HYDROCHLORIDE 200 MG: 200 TABLET, FILM COATED ORAL at 08:06

## 2023-06-26 RX ADMIN — PRENATAL VIT W/ FE FUMARATE-FA TAB 27-0.8 MG 1 TABLET: 27-0.8 TAB at 08:06

## 2023-06-26 NOTE — PROGRESS NOTES
POSTPARTUM PROGRESS NOTE     Vidya Grewal is a 28 y.o. female POD #3 status post Primary  section (breech) at 37w1d in a pregnancy complicated by GHTN and GDM A 2 (18 qhs/6 TID). Patient is doing well this morning. Eating and drinking well.  Patient reports mild to no abdominal pain, adequately relieved by oral pain medications. Lochia is mild to moderate  and decreasing. Patient is voiding and she is ambulating with no difficulty. She has passed flatus, and has not had BM.  Patient does not plan to breast feed.     Objective:       Temp:  [97.3 °F (36.3 °C)-98.7 °F (37.1 °C)] 98.7 °F (37.1 °C)  Pulse:  [] 98  Resp:  [18] 18  SpO2:  [99 %-100 %] 99 %  BP: (135-145)/(86-94) 144/93    General:   alert, appears stated age, and cooperative   Lungs:   Non-labored breathing   Heart:   regular rate and rhythm   Abdomen:  soft, non-tender; bowel sounds normal; no masses,  no organomegaly   Uterus:  firm located at the umblicus.    Incision: Bandage removed, no active bleeding or leakage of fluid. Bandage replaced    Extremities: pedal edema 1 +     Lab Review  No results found for this or any previous visit (from the past 4 hour(s)).      I/O  No intake or output data in the 24 hours ending 23 1244     Assessment:     Patient Active Problem List   Diagnosis    Delivery of pregnancy by  section        Plan:   1. Postpartum care:  - Patient doing well. Continue routine management and advances.  - Continue PO pain meds. Pain well controlled.  - Heme: H/H - 12.4/40.9 > 10.5/34.8 Excellent UOP  - Encourage ambulation  - Contraception - POPs  - Lactation consultant following    2. GHTN  - BP: 130-140s/80-90s  - will continue Labetalol 200BID    3. GDM  - Discontinue insulin therapy  - AM Fasting glucose not completed   - 2 HR GTT PP      Dispo: discharge home today     Claudia Gómez

## 2023-06-26 NOTE — NURSING
Reviewed discharge documentation and medications with patient. Pt verbalized f/u appt is to be scheduled w/ OB. Pt is bonding with baby. Smiles appropriately at baby. Family support noted at bedside.  Mother shows she is able to care for herself and baby. Patient reports having help at home. Pt discharged in stable condition.

## 2023-06-26 NOTE — DISCHARGE SUMMARY
Obstetrical Discharge Summary    Admission Date: 2023   Discharge Date: 2023  Admitting Diagnosis: Elevated blood pressure affecting pregnancy in third trimester, antepartum [O16.3] Intrauterine pregnancy 37w1d  Discharge Diagnosis: , Low Transverse     Procedure: PLTCD  Consults: none     Delivery date/time: 2023  @ 5:41 pm   Delivery type: , Low Transverse   Delivery Anesthesia: Spinal   Episiotomy:Episiotomy:    Laceration type:   none  Infant sex: male   Infant birthweight: 3.19 kg (7 lb 0.5 oz)     Apgars:  1 minute: 9    5 minute: 9    10 minute:    15 minute:                       Hospital Course: Patient was admitted to L&D for  elevated Bps, gHTN . She had a primary  due to gHTN and breech presentation of a viable male infant weighing 3.19 kg (7 lb 0.5 oz) .  She was transferred to [facility] baby in stable condition.  Her recovery was uncomplicated and by post operative day 3 she was tolerating PO without N/V, ambulating without difficulty, her pain was well controlled with PO pain medications and she had passed flatus. She was bottlefeeding. She desired POPs for birth control. She was stable and ready for discharge.       Pertinent studies:  Postpartum CBC  Lab Results   Component Value Date    WBC 11.08 2023    HGB 10.5 (L) 2023    HCT 34.8 (L) 2023    MCV 75 (L) 2023     2023       Vital signs at discharge:  BP (!) 144/93 (BP Location: Right arm)   Pulse 98   Temp 98.7 °F (37.1 °C) (Oral)   Resp 18   LMP 2022 (Approximate)   SpO2 99%   Breastfeeding Unknown     Rh Immune Globulin Given(O POS): N/A   Rubella Vaccine Given: N/A   Tdap Vaccine Given: ordered to give PP  Contraception: POP      Patient Instructions:   Current Discharge Medication List        START taking these medications    Details   docusate sodium (COLACE) 100 MG capsule Take 2 capsules (200 mg total) by mouth 2 (two) times daily.  Qty: 60 capsule,  Refills: 1      ibuprofen (ADVIL,MOTRIN) 800 MG tablet Take 1 tablet (800 mg total) by mouth every 8 (eight) hours.  Qty: 60 tablet, Refills: 1      labetaloL (NORMODYNE) 200 MG tablet Take 1 tablet (200 mg total) by mouth every 12 (twelve) hours.  Qty: 60 tablet, Refills: 1    Comments: .      oxyCODONE-acetaminophen (PERCOCET) 5-325 mg per tablet Take 1 tablet by mouth every 4 (four) hours as needed for Pain.  Qty: 20 tablet, Refills: 0    Comments: Quantity prescribed more than 7 day supply? No           STOP taking these medications       blood-glucose meter kit Comments:   Reason for Stopping:         COMPLETENATE 29 mg iron- 1 mg Chew Comments:   Reason for Stopping:         insulin detemir U-100, Levemir, (LEVEMIR FLEXPEN) 100 unit/mL (3 mL) InPn pen Comments:   Reason for Stopping:         insulin lispro (HUMALOG KWIKPEN INSULIN) 100 unit/mL pen Comments:   Reason for Stopping:         TRUE METRIX GLUCOSE METER Misc Comments:   Reason for Stopping:               Discharge Procedure Orders   Pelvic Rest     Notify your health care provider if you experience any of the following:  temperature >100.4     Notify your health care provider if you experience any of the following:  persistent nausea and vomiting or diarrhea     Notify your health care provider if you experience any of the following:  severe uncontrolled pain     Notify your health care provider if you experience any of the following:  redness, tenderness, or signs of infection (pain, swelling, redness, odor or green/yellow discharge around incision site)     Notify your health care provider if you experience any of the following:  severe persistent headache     Notify your health care provider if you experience any of the following:  persistent dizziness, light-headedness, or visual disturbances     Leave dressing on - Keep it clean, dry, and intact until clinic visit     Activity as tolerated           Disposition: home   Condition: stable for  discharge  Follow up: 1 week for incision check and 6 weeks for post-partum check      Preprinted discharge instructions given to patient.            Claudia Gómez MD

## 2023-06-26 NOTE — DISCHARGE INSTRUCTIONS
"Patient Discharge Instructions for Postpartum Women    Resume Regular Diet  Increase activity gradually, no heavy lifting  Shower  No tampons, douching or sexual intercourse.  Discuss birth control options with your physician.  Wear a support bra  Return to work/school when you've been cleared by a physician    Call your physician if     *Fever of 100.4 or higher  *Persistent nausea/ vomiting  *Incisional drainage  *Heavy vaginal bleeding or large clots (Heavy bleeding is soaking 1 pad in an hour)  *Swelling and pain in arms or legs  *Severe headaches, blurred vision or fainting  *Shortness of breath  *Frequency and burning with urination  *Signs of postpartum depression, discuss these signs with your physician    Call lactation services for questions regarding feeding, nipple and breast care, and general questions about lactation.  They can be reached at [phone]         Understanding Postpartum Depression    You've just had a baby.  You know you should be excited and happy.  But instead you find yourself crying for no reason.  You may have trouble coping with your daily tasks.  You feel sad, tired, and hopeless most of the time.  You may even feel ashamed or guilty.  But what you're going through is not your fault and you can feel better.  Talk to your doctor.  He or she can help.    Depression After Childbirth    You may be weepy and tired right after giving birth.  These feelings are normal.  They're sometimes called the "baby blues."  These blues go away 2-3 weeks.  However, postpartum (meaning "after birth") depression lasts much longer and is more sever than the "baby blues."  It can make you feel sad and hopeless.  You may also fear that your baby will be harmed and worry about being a bad mother.      What is Depression?    Depression is a mood disorder that affects the way you think and feel.  The most common symptom is a feeling of deep sadness.  You may also feel as if you just can't cope with life.  "   Other symptoms include:      * Gaining or loosing weight  * Sleeping too much or too little  * Feeling tired all the time  * Feeling restless  * Fears of harming your baby   * Lack of interest in your baby  * Feeling worthless or guilty  * No longer finding pleasure in things you used to  * Having trouble thinking clearly or making decisions  * Thoughts of hurting yourself or your baby    What Causes Postpartum Depression    The exact causes of postpartum depression isn't known.  It may be due to changes in your hormones during and after childbirth.  You may also be tired from caring for your baby and adjusting to being a mother.  All these factors may make you feel depressed.  In some cases, your genes may also play a role.    Depression Can Be Treated    The good news is that there are many ways to treat postpartum depression.  Talking to your doctor is the first step toward feeling better.    Resources:    * National [place] of Mental Health  -- 325.746.6010    www.nimh.nih.gov    * National [place] on Mental Illness --727.952.1070    Www.beni.org    * Mental Health [place] -- 680.635.6584     Www.[facility].org    * National Suicide Hotline --296.710.8108 (800-SUICIDE)    7926-1502 [employer]  All rights reserved.  This information is not intended as a substitute for professional medical care.  Always follow up with your healthcare professional's instructions.

## 2023-07-03 ENCOUNTER — PATIENT MESSAGE (OUTPATIENT)
Dept: OBSTETRICS AND GYNECOLOGY | Facility: CLINIC | Age: 28
End: 2023-07-03
Payer: MEDICAID

## 2023-07-08 ENCOUNTER — PATIENT MESSAGE (OUTPATIENT)
Dept: OBSTETRICS AND GYNECOLOGY | Facility: CLINIC | Age: 28
End: 2023-07-08
Payer: MEDICAID

## 2023-07-09 ENCOUNTER — HOSPITAL ENCOUNTER (EMERGENCY)
Facility: HOSPITAL | Age: 28
Discharge: HOME | End: 2023-07-09
Attending: EMERGENCY MEDICINE
Payer: MEDICAID

## 2023-07-09 VITALS
RESPIRATION RATE: 14 BRPM | DIASTOLIC BLOOD PRESSURE: 81 MMHG | HEART RATE: 60 BPM | BODY MASS INDEX: 32.4 KG/M2 | TEMPERATURE: 98 F | WEIGHT: 155 LBS | SYSTOLIC BLOOD PRESSURE: 134 MMHG | OXYGEN SATURATION: 100 %

## 2023-07-09 DIAGNOSIS — Z48.89 ENCOUNTER FOR POST SURGICAL WOUND CHECK: Primary | ICD-10-CM

## 2023-07-09 PROCEDURE — 99282 EMERGENCY DEPT VISIT SF MDM: CPT

## 2023-07-09 NOTE — ED PROVIDER NOTES
Encounter Date: 2023       History     Chief Complaint   Patient presents with    Post-op Problem     Csection  and is having incision site pain, denies fevers, chills, weakness     28-year-old female with a history of hypertension presents wanting a wound check.  She had a  2 weeks ago.  She missed her postop appointment.  She has slight burning and irritation under the dressing.  Associated dysuria.  Patient denies nausea, vomiting, diarrhea, fever, cough, shortness of breath, chest pain, or abdominal pain.  The patients available PMH, PSH, Social History, medications, allergies, and triage vital signs were reviewed just prior to their medical evaluation.          Review of patient's allergies indicates:  No Known Allergies  No past medical history on file.  Past Surgical History:   Procedure Laterality Date     SECTION N/A 2023    Procedure:  SECTION;  Surgeon: Claudia Gómez MD;  Location: [facility] L&D;  Service: OB/GYN;  Laterality: N/A;    MULTIPLE TOOTH EXTRACTIONS      WISDOM TOOTH EXTRACTION       Family History   Problem Relation Age of Onset    Diabetes Maternal Grandmother     Diabetes Mother     Breast cancer Neg Hx     Colon cancer Neg Hx     Ovarian cancer Neg Hx      Social History     Tobacco Use    Smoking status: Former     Types: Cigarettes    Smokeless tobacco: Never   Substance Use Topics    Alcohol use: No    Drug use: Not Currently     Types: Marijuana     Review of Systems   Constitutional:  Negative for fever.   Respiratory:  Negative for cough and shortness of breath.    Cardiovascular:  Negative for chest pain.   Gastrointestinal:  Negative for abdominal pain, diarrhea, nausea and vomiting.   Genitourinary:  Positive for dysuria.   Skin:  Positive for wound.     Physical Exam     Initial Vitals [23 0231]   BP Pulse Resp Temp SpO2   134/81 60 14 98.3 °F (36.8 °C) 100 %      MAP       --         Physical Exam    Nursing note and vitals  reviewed.  Constitutional: She appears well-developed and well-nourished. She is not diaphoretic. No distress.   HENT:   Head: Normocephalic and atraumatic.   Nose: Nose normal.   Eyes: Conjunctivae are normal. Right eye exhibits no discharge. Left eye exhibits no discharge.   Neck: Neck supple.   Normal range of motion.  Cardiovascular:  Normal rate, regular rhythm and normal heart sounds.     Exam reveals no gallop and no friction rub.       No murmur heard.  Pulmonary/Chest: Breath sounds normal. No respiratory distress. She has no wheezes. She has no rhonchi. She has no rales.   Abdominal: Abdomen is soft. She exhibits no distension. There is no abdominal tenderness.   Post gravid uterus There is no rebound and no guarding.   Musculoskeletal:         General: No tenderness or edema. Normal range of motion.      Cervical back: Normal range of motion and neck supple.     Neurological: She is alert and oriented to person, place, and time. GCS score is 15. GCS eye subscore is 4. GCS verbal subscore is 5. GCS motor subscore is 6.   Skin: Skin is warm and dry. No rash noted. No erythema.   Incision c/d/I, no infection/erythema/purulent discharge   Psychiatric: She has a normal mood and affect. Her behavior is normal. Judgment and thought content normal.       ED Course   Procedures  Labs Reviewed - No data to display       Imaging Results    None          Medications - No data to display  Medical Decision Making:   ED Management:  28-year-old female presents needing a wound check.  Vitals normal.  Physical exam as above.  Postsurgical wound looks excellent.  No evidence of infection.  Dressing was not replaced.  She will call her OB on Monday to schedule follow-up.  Patient offered urinalysis for dysuria, but declined.  She has decision-making capacity.  Patient will return to ED for worsening symptoms, inability to eat/drink, fever greater than 100.4, or any other concerns.   Did bedside teaching with return  precautions.  All questions answered.  The patient acknowledges understanding.  Gave verbal discharge instructions.                         Clinical Impression:   Final diagnoses:  [Z48.89] Encounter for post surgical wound check (Primary)        ED Disposition Condition    Discharge Stable          ED Prescriptions    None       Follow-up Information       Follow up With Specialties Details Why Contact Info    Call your OB Monday to reschedule appointment        [facility] Emergency Medicine  Return to ED for worsening symptoms, inability to eat/drink, fever greater than 100.4, or any other concerns. [address]  [facility] [id number] [phone]             Artis Grullon MD  07/09/23 0411

## 2023-07-14 ENCOUNTER — PATIENT MESSAGE (OUTPATIENT)
Dept: OBSTETRICS AND GYNECOLOGY | Facility: CLINIC | Age: 28
End: 2023-07-14
Payer: MEDICAID

## 2023-07-27 ENCOUNTER — PATIENT MESSAGE (OUTPATIENT)
Dept: OBSTETRICS AND GYNECOLOGY | Facility: CLINIC | Age: 28
End: 2023-07-27
Payer: MEDICAID

## 2023-07-31 ENCOUNTER — POSTPARTUM VISIT (OUTPATIENT)
Dept: OBSTETRICS AND GYNECOLOGY | Facility: CLINIC | Age: 28
End: 2023-07-31
Payer: MEDICAID

## 2023-07-31 VITALS
SYSTOLIC BLOOD PRESSURE: 142 MMHG | DIASTOLIC BLOOD PRESSURE: 92 MMHG | HEIGHT: 58 IN | BODY MASS INDEX: 34.3 KG/M2 | WEIGHT: 163.38 LBS | HEART RATE: 89 BPM

## 2023-07-31 DIAGNOSIS — N89.8 VAGINAL ODOR: ICD-10-CM

## 2023-07-31 DIAGNOSIS — R30.0 DYSURIA: ICD-10-CM

## 2023-07-31 PROCEDURE — 99999 PR PBB SHADOW E&M-EST. PATIENT-LVL III: ICD-10-PCS | Mod: PBBFAC,,, | Performed by: STUDENT IN AN ORGANIZED HEALTH CARE EDUCATION/TRAINING PROGRAM

## 2023-07-31 PROCEDURE — 99213 OFFICE O/P EST LOW 20 MIN: CPT | Mod: PBBFAC,PO | Performed by: STUDENT IN AN ORGANIZED HEALTH CARE EDUCATION/TRAINING PROGRAM

## 2023-07-31 PROCEDURE — 81514 NFCT DS BV&VAGINITIS DNA ALG: CPT | Performed by: STUDENT IN AN ORGANIZED HEALTH CARE EDUCATION/TRAINING PROGRAM

## 2023-07-31 PROCEDURE — 87086 URINE CULTURE/COLONY COUNT: CPT | Performed by: STUDENT IN AN ORGANIZED HEALTH CARE EDUCATION/TRAINING PROGRAM

## 2023-07-31 PROCEDURE — 59430 PR CARE AFTER DELIVERY ONLY: ICD-10-PCS | Mod: ,,, | Performed by: STUDENT IN AN ORGANIZED HEALTH CARE EDUCATION/TRAINING PROGRAM

## 2023-07-31 PROCEDURE — 99999 PR PBB SHADOW E&M-EST. PATIENT-LVL III: CPT | Mod: PBBFAC,,, | Performed by: STUDENT IN AN ORGANIZED HEALTH CARE EDUCATION/TRAINING PROGRAM

## 2023-07-31 RX ORDER — NORETHINDRONE ACETATE AND ETHINYL ESTRADIOL 1MG-20(21)
1 KIT ORAL DAILY
Qty: 90 TABLET | Refills: 3 | Status: SHIPPED | OUTPATIENT
Start: 2023-07-31 | End: 2024-07-30

## 2023-07-31 RX ORDER — NITROFURANTOIN 25; 75 MG/1; MG/1
100 CAPSULE ORAL 2 TIMES DAILY
Qty: 14 CAPSULE | Refills: 0 | Status: SHIPPED | OUTPATIENT
Start: 2023-07-31 | End: 2023-08-07

## 2023-07-31 NOTE — PROGRESS NOTES
"  CC: Post-partum follow-up    Vidya Grewal is a 28 y.o. female  who presents for post-partum visit.  She is S/P a  CS delivery due to breech presentation, gDM and gHTN .  She and the baby are doing well.  No pain.  No fever.   No bowel complaints. She reports vaginal discharge and odor, bleeding has stopped. She is also experiencing UTI symptoms/dysuria     Delivery Date: 2023  Gender: male  Birth Weight: 7 pounds 0 ounces  Breast Feeding: NO  Depression: NO  Contraception: no method    Pregnancy was complicated by:  gDM, gHTN    BP (!) 142/92   Pulse 89   Ht 4' 10" (1.473 m)   Wt 74.1 kg (163 lb 5.8 oz)   LMP 2022 (Approximate)   Breastfeeding No   BMI 34.14 kg/m²     ROS:  GENERAL: No fever, chills, fatigability.  VULVAR: No pain, no lesions and no itching.  VAGINAL: No relaxation, no itching, no abnormal bleeding and no lesions.  ABDOMEN: No abdominal pain. Denies nausea. Denies vomiting. No diarrhea. No constipation  BREAST: Denies pain. No lumps. No discharge.  URINARY: No incontinence, no nocturia   CARDIOVASCULAR: No chest pain. No shortness of breath. No leg cramps.  NEUROLOGICAL: No headaches. No vision changes.    PHYSICAL EXAM:  ABDOMEN:  Soft, non-tender, non-distended  INCISION: clean, well healed, no erythema or discharge   VULVA:  Normal, no lesions    IMP:  Doing well S/P CS  Instructions / precautions reviewed  Contraceptive counseling - desires to restart OCPs, rx sent to pharmacy  Urine culture, affirm collected today   Continue BP med until BP normalized, patient will measure at home with her cuff    PLAN:  May resume normal activities  Macrobid for UTI   Will treat discharge if pos yeast or BV      Follow up in about 1 year (around 2024).        "

## 2023-08-02 LAB
BACTERIA UR CULT: NO GROWTH
BACTERIAL VAGINOSIS DNA: NEGATIVE
CANDIDA GLABRATA DNA: NEGATIVE
CANDIDA KRUSEI DNA: NEGATIVE
CANDIDA RRNA VAG QL PROBE: NEGATIVE
T VAGINALIS RRNA GENITAL QL PROBE: NEGATIVE

## 2023-12-15 ENCOUNTER — HOSPITAL ENCOUNTER (EMERGENCY)
Facility: HOSPITAL | Age: 28
Discharge: HOME | End: 2023-12-16
Attending: EMERGENCY MEDICINE
Payer: MEDICAID

## 2023-12-15 DIAGNOSIS — N93.9 VAGINAL BLEEDING: Primary | ICD-10-CM

## 2023-12-15 PROCEDURE — 80048 BASIC METABOLIC PNL TOTAL CA: CPT

## 2023-12-15 PROCEDURE — 81025 URINE PREGNANCY TEST: CPT | Performed by: EMERGENCY MEDICINE

## 2023-12-15 PROCEDURE — 99284 EMERGENCY DEPT VISIT MOD MDM: CPT

## 2023-12-16 VITALS
HEART RATE: 90 BPM | TEMPERATURE: 98 F | RESPIRATION RATE: 16 BRPM | OXYGEN SATURATION: 99 % | SYSTOLIC BLOOD PRESSURE: 152 MMHG | DIASTOLIC BLOOD PRESSURE: 68 MMHG

## 2023-12-16 LAB
B-HCG UR QL: NEGATIVE
BUN SERPL-MCNC: 11 MG/DL (ref 6–30)
CHLORIDE SERPL-SCNC: 103 MMOL/L (ref 95–110)
CREAT SERPL-MCNC: 0.5 MG/DL (ref 0.5–1.4)
CTP QC/QA: YES
GLUCOSE SERPL-MCNC: 198 MG/DL (ref 70–110)
HCT VFR BLD CALC: 43 %PCV (ref 36–54)
POC IONIZED CALCIUM: 1.17 MMOL/L (ref 1.06–1.42)
POC TCO2 (MEASURED): 22 MMOL/L (ref 23–29)
POTASSIUM BLD-SCNC: 3.5 MMOL/L (ref 3.5–5.1)
SAMPLE: ABNORMAL
SODIUM BLD-SCNC: 141 MMOL/L (ref 136–145)

## 2023-12-16 NOTE — DISCHARGE INSTRUCTIONS
Diagnosis:   1. Vaginal bleeding      Home Care Instructions:  - Medications: Continue taking your home medications as prescribed    Follow-Up Plan:  - Follow-up with: Primary care provider within 1 week  - Follow up with your OB/Gyn within 1 week or when possible    Return to the Emergency Department for symptoms including but not limited to: worsening symptoms, vomiting with inability to hold down fluids, or other concerning symptoms.

## 2023-12-16 NOTE — ED PROVIDER NOTES
"Encounter Date: 12/15/2023       History     Chief Complaint   Patient presents with    Vaginal Bleeding     States on menstrual cycle, passed a clot "the size of a quarter." States this is her 2nd cycle after having a baby 6 months ago     28-year-old female who presents to the ED for chief complaint of vaginal bleeding.  Patient recently gave birth about 6 months ago via  due to gestational hypertension and child in breech position.  She started her menstrual cycle on  and stated that her cycle started normal and was comparable to her previous menstruation cycles.  She also notes that she had 1 prior menstrual cycle since her childbirth without any issues.  She started to have heavy vaginal bleeding today and has been using 3 pads about every 30-40 minutes.  Patient notes that her pads are completely saturated with blood when she has to change them.  She also states that she had a quarter-sized clot today.  She denies any fevers, nausea, vomiting, abdominal pain, or vaginal pain.    The history is provided by the patient. No  was used.     Review of patient's allergies indicates:  No Known Allergies  No past medical history on file.  Past Surgical History:   Procedure Laterality Date     SECTION N/A 2023    Procedure:  SECTION;  Surgeon: Claudia Gómez MD;  Location: [facility] L&D;  Service: OB/GYN;  Laterality: N/A;    MULTIPLE TOOTH EXTRACTIONS      WISDOM TOOTH EXTRACTION       Family History   Problem Relation Age of Onset    Diabetes Maternal Grandmother     Diabetes Mother     Breast cancer Neg Hx     Colon cancer Neg Hx     Ovarian cancer Neg Hx      Social History     Tobacco Use    Smoking status: Former     Types: Cigarettes    Smokeless tobacco: Never   Substance Use Topics    Alcohol use: No    Drug use: Not Currently     Types: Marijuana     Review of Systems    Physical Exam     Initial Vitals [12/15/23 2334]   BP Pulse Resp Temp SpO2   (!) 156/84 " 100 18 98.1 °F (36.7 °C) 100 %      MAP       --         Physical Exam    Nursing note and vitals reviewed.  Constitutional: She appears well-developed. No distress.   HENT:   Head: Normocephalic.   Mouth/Throat: Oropharynx is clear and moist.   Eyes: Conjunctivae are normal. No scleral icterus.   Neck:   Normal range of motion.  Cardiovascular:  Normal rate, regular rhythm and normal heart sounds.           Pulmonary/Chest: Breath sounds normal. No respiratory distress. She has no wheezes. She has no rhonchi. She has no rales.   Abdominal: Abdomen is soft. She exhibits no distension. There is no abdominal tenderness. There is no rebound and no guarding.   Genitourinary:    Vagina normal.      No vaginal discharge.      Genitourinary Comments: Minimal amount of blood in the vaginal canal.  No clots.     Musculoskeletal:         General: Normal range of motion.      Cervical back: Normal range of motion.     Neurological: She is alert.   Skin: Skin is warm. Capillary refill takes less than 2 seconds.   Good skin turgor.   Psychiatric: She has a normal mood and affect.         ED Course   Procedures  Labs Reviewed   ISTAT PROCEDURE - Abnormal; Notable for the following components:       Result Value    POC Glucose 198 (*)     POC TCO2 (MEASURED) 22 (*)     All other components within normal limits   POCT URINE PREGNANCY          Imaging Results    None          Medications - No data to display  Medical Decision Making  28-year-old female who presents for heavy vaginal bleeding.  She gave birth to a baby via  6 months prior.  Endorses menorrhagia.  Denies dysmenorrhea.  She is hypertensive, other vitals WNL.  On exam, external vaginal appears normal.  She has minimal amount of blood in the vaginal canal.  Please see physical exam findings above for additional details.  Differential diagnoses include but are not limited to postpartum hormonal changes, pregnancy, doubt retained products of conception.  Obtained  POCT urine pregnancy and i-STAT.  Urine pregnancy is negative.  I-STAT hematocrit is WNL.    Discussed clinical findings with the patient.  Discussed follow-up with Ob gyn provider and with primary care provider.  I informed the patient of the precautions for when to return to the emergency department.  I answered the patient's remaining questions.  Patient was discharged to home.    Amount and/or Complexity of Data Reviewed  Labs: ordered.                                      Clinical Impression:  Final diagnoses:  [N93.9] Vaginal bleeding (Primary)          ED Disposition Condition    Discharge Stable          ED Prescriptions    None       Follow-up Information       Follow up With Specialties Details Why Contact [facility] Child and Adolescent Psychiatry, Psychology, Family Medicine, Obstetrics Go in 1 week  [address] [facility]  [address]  [facility] [address]  [phone]               Alex, MD Timothy  Resident  12/16/23 0076

## 2023-12-16 NOTE — ED TRIAGE NOTES
"Vidya Grewal, a 28 y.o. female presents to the ED w/ complaint of vaginal bleeding. Pt states has been on menstrual cycle and earlier today, passed a clot "the size of a quarter." States this is her 2nd cycle after have a baby 6 months ago. Denies abdominal pain.    Triage note:  Chief Complaint   Patient presents with    Vaginal Bleeding     States on menstrual cycle, passed a clot "the size of a quarter." States this is her 2nd cycle after having a baby 6 months ago     Review of patient's allergies indicates:  No Known Allergies  No past medical history on file.   Patient identifiers for Vidya Grewal checked and correct.    LOC: The patient is awake, alert and aware of environment with an appropriate affect, the patient is oriented x 4 and speaking appropriately.    APPEARANCE: Patient resting comfortably and in no acute distress, patient is clean and well groomed, patient's clothing is properly fastened.    SKIN: The skin is warm and dry, color consistent with ethnicity, patient has normal skin turgor and moist mucus membranes, skin intact, no breakdown or bruising noted.    MUSCULOSKELETAL: Patient moving all extremities well, no obvious swelling or deformities noted.    RESPIRATORY: Airway is open and patent, respirations are spontaneous and even, patient has a normal effort and rate.    CARDIAC: Patient has a normal rate and rhythm, no periphreal edema noted, capillary refill < 3 seconds.    ABDOMEN: Soft and non tender to palpation, no distention noted. Patient denies any nausea, vomiting, diarrhea, or constipation.     NEUROLOGIC: Eyes open spontaneously, PERRL, behavior appropriate to situation, follows commands, facial expression symmetrical, bilateral hand grasp equal and even, purposeful motor response noted, normal sensation in all extremities.     HEENT: No abnormalities noted. White sclera and pupils equal round and reactive to light. Denies headache, dizziness.     : Pt voids independently, " denies dysuria, hematuria, frequency. + vaginal bleeding, clot

## 2023-12-16 NOTE — ED NOTES
I-STAT Chem-8+ Results:   Value Reference Range   Sodium 141 136-145 mmol/L   Potassium  3.5 3.5-5.1 mmol/L   Chloride 103  mmol/L   Ionized Calcium 1.17 1.06-1.42 mmol/L   CO2 (measured) 22 23-29 mmol/L   Glucose 198  mg/dL   BUN 11 6-30 mg/dL   Creatinine 0.5 0.5-1.4 mg/dL   Hematocrit 43 36-54%

## 2025-08-20 ENCOUNTER — PATIENT MESSAGE (OUTPATIENT)
Dept: OBSTETRICS AND GYNECOLOGY | Facility: CLINIC | Age: 30
End: 2025-08-20

## 2025-08-20 ENCOUNTER — CLINICAL SUPPORT (OUTPATIENT)
Dept: OBSTETRICS AND GYNECOLOGY | Facility: CLINIC | Age: 30
End: 2025-08-20

## 2025-08-20 DIAGNOSIS — Z3A.26 26 WEEKS GESTATION OF PREGNANCY: Primary | ICD-10-CM

## (undated) DEVICE — DRESSING AQUACEL AG ADV 3.5X12